# Patient Record
Sex: FEMALE | Race: WHITE | NOT HISPANIC OR LATINO | Employment: OTHER | URBAN - METROPOLITAN AREA
[De-identification: names, ages, dates, MRNs, and addresses within clinical notes are randomized per-mention and may not be internally consistent; named-entity substitution may affect disease eponyms.]

---

## 2020-06-08 ENCOUNTER — LAB VISIT (OUTPATIENT)
Dept: PRIMARY CARE CLINIC | Facility: OTHER | Age: 75
End: 2020-06-08
Payer: MEDICARE

## 2020-06-08 PROCEDURE — U0003 INFECTIOUS AGENT DETECTION BY NUCLEIC ACID (DNA OR RNA); SEVERE ACUTE RESPIRATORY SYNDROME CORONAVIRUS 2 (SARS-COV-2) (CORONAVIRUS DISEASE [COVID-19]), AMPLIFIED PROBE TECHNIQUE, MAKING USE OF HIGH THROUGHPUT TECHNOLOGIES AS DESCRIBED BY CMS-2020-01-R: HCPCS

## 2020-06-09 LAB — SARS-COV-2 RNA RESP QL NAA+PROBE: NOT DETECTED

## 2020-06-15 ENCOUNTER — LAB VISIT (OUTPATIENT)
Dept: LAB | Facility: OTHER | Age: 75
End: 2020-06-15
Payer: MEDICARE

## 2020-06-15 DIAGNOSIS — Z20.822 SUSPECTED COVID-19 VIRUS INFECTION: ICD-10-CM

## 2020-06-15 PROCEDURE — U0003 INFECTIOUS AGENT DETECTION BY NUCLEIC ACID (DNA OR RNA); SEVERE ACUTE RESPIRATORY SYNDROME CORONAVIRUS 2 (SARS-COV-2) (CORONAVIRUS DISEASE [COVID-19]), AMPLIFIED PROBE TECHNIQUE, MAKING USE OF HIGH THROUGHPUT TECHNOLOGIES AS DESCRIBED BY CMS-2020-01-R: HCPCS

## 2020-06-18 LAB — SARS-COV-2 RNA RESP QL NAA+PROBE: NOT DETECTED

## 2020-06-22 ENCOUNTER — LAB VISIT (OUTPATIENT)
Dept: LAB | Facility: OTHER | Age: 75
End: 2020-06-22
Payer: MEDICARE

## 2020-06-22 DIAGNOSIS — Z20.822 SUSPECTED COVID-19 VIRUS INFECTION: ICD-10-CM

## 2020-06-22 PROCEDURE — U0003 INFECTIOUS AGENT DETECTION BY NUCLEIC ACID (DNA OR RNA); SEVERE ACUTE RESPIRATORY SYNDROME CORONAVIRUS 2 (SARS-COV-2) (CORONAVIRUS DISEASE [COVID-19]), AMPLIFIED PROBE TECHNIQUE, MAKING USE OF HIGH THROUGHPUT TECHNOLOGIES AS DESCRIBED BY CMS-2020-01-R: HCPCS

## 2020-06-24 LAB — SARS-COV-2 RNA RESP QL NAA+PROBE: NOT DETECTED

## 2020-06-29 ENCOUNTER — LAB VISIT (OUTPATIENT)
Dept: LAB | Facility: OTHER | Age: 75
End: 2020-06-29
Payer: MEDICARE

## 2020-06-29 DIAGNOSIS — Z20.822 SUSPECTED COVID-19 VIRUS INFECTION: ICD-10-CM

## 2020-06-29 PROCEDURE — U0003 INFECTIOUS AGENT DETECTION BY NUCLEIC ACID (DNA OR RNA); SEVERE ACUTE RESPIRATORY SYNDROME CORONAVIRUS 2 (SARS-COV-2) (CORONAVIRUS DISEASE [COVID-19]), AMPLIFIED PROBE TECHNIQUE, MAKING USE OF HIGH THROUGHPUT TECHNOLOGIES AS DESCRIBED BY CMS-2020-01-R: HCPCS | Mod: ST72

## 2020-07-05 LAB — SARS-COV-2 RNA RESP QL NAA+PROBE: NEGATIVE

## 2020-07-06 ENCOUNTER — LAB VISIT (OUTPATIENT)
Dept: LAB | Facility: OTHER | Age: 75
End: 2020-07-06
Payer: MEDICARE

## 2020-07-06 DIAGNOSIS — Z20.822 SUSPECTED COVID-19 VIRUS INFECTION: ICD-10-CM

## 2020-07-06 PROCEDURE — U0003 INFECTIOUS AGENT DETECTION BY NUCLEIC ACID (DNA OR RNA); SEVERE ACUTE RESPIRATORY SYNDROME CORONAVIRUS 2 (SARS-COV-2) (CORONAVIRUS DISEASE [COVID-19]), AMPLIFIED PROBE TECHNIQUE, MAKING USE OF HIGH THROUGHPUT TECHNOLOGIES AS DESCRIBED BY CMS-2020-01-R: HCPCS

## 2020-07-07 LAB — SARS-COV-2 RNA RESP QL NAA+PROBE: NEGATIVE

## 2020-07-13 ENCOUNTER — LAB VISIT (OUTPATIENT)
Dept: LAB | Facility: OTHER | Age: 75
End: 2020-07-13
Attending: INTERNAL MEDICINE
Payer: MEDICARE

## 2020-07-13 DIAGNOSIS — Z20.822 SUSPECTED COVID-19 VIRUS INFECTION: ICD-10-CM

## 2020-07-13 PROCEDURE — U0003 INFECTIOUS AGENT DETECTION BY NUCLEIC ACID (DNA OR RNA); SEVERE ACUTE RESPIRATORY SYNDROME CORONAVIRUS 2 (SARS-COV-2) (CORONAVIRUS DISEASE [COVID-19]), AMPLIFIED PROBE TECHNIQUE, MAKING USE OF HIGH THROUGHPUT TECHNOLOGIES AS DESCRIBED BY CMS-2020-01-R: HCPCS

## 2020-07-16 LAB — SARS-COV-2 RNA RESP QL NAA+PROBE: NEGATIVE

## 2020-07-20 ENCOUNTER — LAB VISIT (OUTPATIENT)
Dept: LAB | Facility: OTHER | Age: 75
End: 2020-07-20
Payer: MEDICARE

## 2020-07-20 DIAGNOSIS — Z20.822 SUSPECTED COVID-19 VIRUS INFECTION: ICD-10-CM

## 2020-07-20 DIAGNOSIS — Z03.818 ENCOUNTER FOR OBSERVATION FOR SUSPECTED EXPOSURE TO OTHER BIOLOGICAL AGENTS RULED OUT: ICD-10-CM

## 2020-07-20 PROCEDURE — U0003 INFECTIOUS AGENT DETECTION BY NUCLEIC ACID (DNA OR RNA); SEVERE ACUTE RESPIRATORY SYNDROME CORONAVIRUS 2 (SARS-COV-2) (CORONAVIRUS DISEASE [COVID-19]), AMPLIFIED PROBE TECHNIQUE, MAKING USE OF HIGH THROUGHPUT TECHNOLOGIES AS DESCRIBED BY CMS-2020-01-R: HCPCS

## 2020-07-23 LAB — SARS-COV-2 RNA RESP QL NAA+PROBE: NEGATIVE

## 2020-07-27 ENCOUNTER — LAB VISIT (OUTPATIENT)
Dept: LAB | Facility: OTHER | Age: 75
End: 2020-07-27
Payer: MEDICARE

## 2020-07-27 DIAGNOSIS — Z20.822 SUSPECTED COVID-19 VIRUS INFECTION: ICD-10-CM

## 2020-07-27 DIAGNOSIS — Z03.818 ENCOUNTER FOR OBSERVATION FOR SUSPECTED EXPOSURE TO OTHER BIOLOGICAL AGENTS RULED OUT: ICD-10-CM

## 2020-07-27 PROCEDURE — U0003 INFECTIOUS AGENT DETECTION BY NUCLEIC ACID (DNA OR RNA); SEVERE ACUTE RESPIRATORY SYNDROME CORONAVIRUS 2 (SARS-COV-2) (CORONAVIRUS DISEASE [COVID-19]), AMPLIFIED PROBE TECHNIQUE, MAKING USE OF HIGH THROUGHPUT TECHNOLOGIES AS DESCRIBED BY CMS-2020-01-R: HCPCS

## 2020-07-31 LAB — SARS-COV-2 RNA RESP QL NAA+PROBE: NEGATIVE

## 2020-08-03 ENCOUNTER — LAB VISIT (OUTPATIENT)
Dept: LAB | Facility: OTHER | Age: 75
End: 2020-08-03
Payer: MEDICARE

## 2020-08-03 DIAGNOSIS — Z20.822 SUSPECTED COVID-19 VIRUS INFECTION: ICD-10-CM

## 2020-08-03 DIAGNOSIS — Z03.818 ENCOUNTER FOR OBSERVATION FOR SUSPECTED EXPOSURE TO OTHER BIOLOGICAL AGENTS RULED OUT: ICD-10-CM

## 2020-08-03 PROCEDURE — U0003 INFECTIOUS AGENT DETECTION BY NUCLEIC ACID (DNA OR RNA); SEVERE ACUTE RESPIRATORY SYNDROME CORONAVIRUS 2 (SARS-COV-2) (CORONAVIRUS DISEASE [COVID-19]), AMPLIFIED PROBE TECHNIQUE, MAKING USE OF HIGH THROUGHPUT TECHNOLOGIES AS DESCRIBED BY CMS-2020-01-R: HCPCS

## 2020-08-04 LAB — SARS-COV-2 RNA RESP QL NAA+PROBE: NOT DETECTED

## 2020-08-10 ENCOUNTER — LAB VISIT (OUTPATIENT)
Dept: LAB | Facility: OTHER | Age: 75
End: 2020-08-10
Payer: MEDICARE

## 2020-08-10 DIAGNOSIS — Z03.818 ENCOUNTER FOR OBSERVATION FOR SUSPECTED EXPOSURE TO OTHER BIOLOGICAL AGENTS RULED OUT: ICD-10-CM

## 2020-08-10 PROCEDURE — U0003 INFECTIOUS AGENT DETECTION BY NUCLEIC ACID (DNA OR RNA); SEVERE ACUTE RESPIRATORY SYNDROME CORONAVIRUS 2 (SARS-COV-2) (CORONAVIRUS DISEASE [COVID-19]), AMPLIFIED PROBE TECHNIQUE, MAKING USE OF HIGH THROUGHPUT TECHNOLOGIES AS DESCRIBED BY CMS-2020-01-R: HCPCS

## 2020-08-11 LAB — SARS-COV-2 RNA RESP QL NAA+PROBE: NOT DETECTED

## 2020-08-17 ENCOUNTER — LAB VISIT (OUTPATIENT)
Dept: LAB | Facility: OTHER | Age: 75
End: 2020-08-17
Payer: MEDICARE

## 2020-08-17 DIAGNOSIS — Z03.818 ENCOUNTER FOR OBSERVATION FOR SUSPECTED EXPOSURE TO OTHER BIOLOGICAL AGENTS RULED OUT: ICD-10-CM

## 2020-08-17 PROCEDURE — U0003 INFECTIOUS AGENT DETECTION BY NUCLEIC ACID (DNA OR RNA); SEVERE ACUTE RESPIRATORY SYNDROME CORONAVIRUS 2 (SARS-COV-2) (CORONAVIRUS DISEASE [COVID-19]), AMPLIFIED PROBE TECHNIQUE, MAKING USE OF HIGH THROUGHPUT TECHNOLOGIES AS DESCRIBED BY CMS-2020-01-R: HCPCS

## 2020-08-20 LAB — SARS-COV-2 RNA RESP QL NAA+PROBE: NOT DETECTED

## 2020-08-24 ENCOUNTER — LAB VISIT (OUTPATIENT)
Dept: LAB | Facility: OTHER | Age: 75
End: 2020-08-24
Payer: MEDICARE

## 2020-08-24 DIAGNOSIS — Z03.818 ENCOUNTER FOR OBSERVATION FOR SUSPECTED EXPOSURE TO OTHER BIOLOGICAL AGENTS RULED OUT: ICD-10-CM

## 2020-08-24 PROCEDURE — U0003 INFECTIOUS AGENT DETECTION BY NUCLEIC ACID (DNA OR RNA); SEVERE ACUTE RESPIRATORY SYNDROME CORONAVIRUS 2 (SARS-COV-2) (CORONAVIRUS DISEASE [COVID-19]), AMPLIFIED PROBE TECHNIQUE, MAKING USE OF HIGH THROUGHPUT TECHNOLOGIES AS DESCRIBED BY CMS-2020-01-R: HCPCS

## 2020-08-25 LAB — SARS-COV-2 RNA RESP QL NAA+PROBE: NOT DETECTED

## 2020-08-31 ENCOUNTER — LAB VISIT (OUTPATIENT)
Dept: LAB | Facility: OTHER | Age: 75
End: 2020-08-31
Payer: MEDICARE

## 2020-08-31 DIAGNOSIS — Z03.818 ENCOUNTER FOR OBSERVATION FOR SUSPECTED EXPOSURE TO OTHER BIOLOGICAL AGENTS RULED OUT: ICD-10-CM

## 2020-08-31 PROCEDURE — U0003 INFECTIOUS AGENT DETECTION BY NUCLEIC ACID (DNA OR RNA); SEVERE ACUTE RESPIRATORY SYNDROME CORONAVIRUS 2 (SARS-COV-2) (CORONAVIRUS DISEASE [COVID-19]), AMPLIFIED PROBE TECHNIQUE, MAKING USE OF HIGH THROUGHPUT TECHNOLOGIES AS DESCRIBED BY CMS-2020-01-R: HCPCS

## 2020-09-02 LAB — SARS-COV-2 RNA RESP QL NAA+PROBE: NOT DETECTED

## 2022-03-31 ENCOUNTER — HOSPITAL ENCOUNTER (INPATIENT)
Facility: HOSPITAL | Age: 77
LOS: 4 days | Discharge: HOSPICE | DRG: 064 | End: 2022-04-05
Attending: EMERGENCY MEDICINE | Admitting: STUDENT IN AN ORGANIZED HEALTH CARE EDUCATION/TRAINING PROGRAM
Payer: MEDICARE

## 2022-03-31 DIAGNOSIS — G93.41 METABOLIC ENCEPHALOPATHY: ICD-10-CM

## 2022-03-31 DIAGNOSIS — J30.9 ALLERGIC RHINITIS, UNSPECIFIED SEASONALITY, UNSPECIFIED TRIGGER: ICD-10-CM

## 2022-03-31 DIAGNOSIS — E78.2 MIXED HYPERLIPIDEMIA: ICD-10-CM

## 2022-03-31 DIAGNOSIS — R07.9 CHEST PAIN: ICD-10-CM

## 2022-03-31 DIAGNOSIS — R41.82 ALTERED MENTAL STATUS: ICD-10-CM

## 2022-03-31 DIAGNOSIS — N30.01 ACUTE CYSTITIS WITH HEMATURIA: ICD-10-CM

## 2022-03-31 DIAGNOSIS — N39.0 URINARY TRACT INFECTION WITHOUT HEMATURIA, SITE UNSPECIFIED: Primary | ICD-10-CM

## 2022-03-31 DIAGNOSIS — E11.9 TYPE 2 DIABETES MELLITUS WITHOUT COMPLICATION, WITHOUT LONG-TERM CURRENT USE OF INSULIN: ICD-10-CM

## 2022-03-31 DIAGNOSIS — I63.9 CEREBROVASCULAR ACCIDENT (CVA), UNSPECIFIED MECHANISM: ICD-10-CM

## 2022-03-31 DIAGNOSIS — G93.40 ACUTE ENCEPHALOPATHY: ICD-10-CM

## 2022-03-31 DIAGNOSIS — R53.1 WEAKNESS: ICD-10-CM

## 2022-03-31 DIAGNOSIS — F03.90 DEMENTIA WITHOUT BEHAVIORAL DISTURBANCE, UNSPECIFIED DEMENTIA TYPE: ICD-10-CM

## 2022-03-31 DIAGNOSIS — G93.41 ENCEPHALOPATHY, METABOLIC: ICD-10-CM

## 2022-03-31 DIAGNOSIS — I10 PRIMARY HYPERTENSION: ICD-10-CM

## 2022-03-31 DIAGNOSIS — F33.0 MILD EPISODE OF RECURRENT MAJOR DEPRESSIVE DISORDER: ICD-10-CM

## 2022-03-31 LAB
ALBUMIN SERPL BCP-MCNC: 3.8 G/DL (ref 3.5–5.2)
ALP SERPL-CCNC: 138 U/L (ref 55–135)
ALT SERPL W/O P-5'-P-CCNC: 18 U/L (ref 10–44)
ANION GAP SERPL CALC-SCNC: 12 MMOL/L (ref 8–16)
AST SERPL-CCNC: 30 U/L (ref 10–40)
BACTERIA #/AREA URNS HPF: ABNORMAL /HPF
BASOPHILS # BLD AUTO: 0.06 K/UL (ref 0–0.2)
BASOPHILS NFR BLD: 0.7 % (ref 0–1.9)
BILIRUB SERPL-MCNC: 0.6 MG/DL (ref 0.1–1)
BILIRUB UR QL STRIP: NEGATIVE
BNP SERPL-MCNC: 42 PG/ML (ref 0–99)
BUN SERPL-MCNC: 15 MG/DL (ref 8–23)
CALCIUM SERPL-MCNC: 9.7 MG/DL (ref 8.7–10.5)
CHLORIDE SERPL-SCNC: 106 MMOL/L (ref 95–110)
CK SERPL-CCNC: 166 U/L (ref 20–180)
CLARITY UR: CLEAR
CO2 SERPL-SCNC: 24 MMOL/L (ref 23–29)
COLOR UR: YELLOW
CREAT SERPL-MCNC: 1.1 MG/DL (ref 0.5–1.4)
DIFFERENTIAL METHOD: ABNORMAL
EOSINOPHIL # BLD AUTO: 0.1 K/UL (ref 0–0.5)
EOSINOPHIL NFR BLD: 1.2 % (ref 0–8)
ERYTHROCYTE [DISTWIDTH] IN BLOOD BY AUTOMATED COUNT: 14.1 % (ref 11.5–14.5)
EST. GFR  (AFRICAN AMERICAN): 57 ML/MIN/1.73 M^2
EST. GFR  (NON AFRICAN AMERICAN): 50 ML/MIN/1.73 M^2
GLUCOSE SERPL-MCNC: 129 MG/DL (ref 70–110)
GLUCOSE UR QL STRIP: NEGATIVE
HCT VFR BLD AUTO: 46.3 % (ref 37–48.5)
HGB BLD-MCNC: 15.1 G/DL (ref 12–16)
HGB UR QL STRIP: NEGATIVE
IMM GRANULOCYTES # BLD AUTO: 0.01 K/UL (ref 0–0.04)
IMM GRANULOCYTES NFR BLD AUTO: 0.1 % (ref 0–0.5)
KETONES UR QL STRIP: NEGATIVE
LEUKOCYTE ESTERASE UR QL STRIP: ABNORMAL
LYMPHOCYTES # BLD AUTO: 2.3 K/UL (ref 1–4.8)
LYMPHOCYTES NFR BLD: 26 % (ref 18–48)
MAGNESIUM SERPL-MCNC: 1.9 MG/DL (ref 1.6–2.6)
MCH RBC QN AUTO: 26.6 PG (ref 27–31)
MCHC RBC AUTO-ENTMCNC: 32.6 G/DL (ref 32–36)
MCV RBC AUTO: 82 FL (ref 82–98)
MICROSCOPIC COMMENT: ABNORMAL
MONOCYTES # BLD AUTO: 0.6 K/UL (ref 0.3–1)
MONOCYTES NFR BLD: 7.3 % (ref 4–15)
NEUTROPHILS # BLD AUTO: 5.7 K/UL (ref 1.8–7.7)
NEUTROPHILS NFR BLD: 64.7 % (ref 38–73)
NITRITE UR QL STRIP: POSITIVE
NRBC BLD-RTO: 0 /100 WBC
PH UR STRIP: 6 [PH] (ref 5–8)
PLATELET # BLD AUTO: 292 K/UL (ref 150–450)
PMV BLD AUTO: 10.5 FL (ref 9.2–12.9)
POCT GLUCOSE: 153 MG/DL (ref 70–110)
POCT GLUCOSE: 222 MG/DL (ref 70–110)
POTASSIUM SERPL-SCNC: 5.4 MMOL/L (ref 3.5–5.1)
PROT SERPL-MCNC: 8 G/DL (ref 6–8.4)
PROT UR QL STRIP: NEGATIVE
RBC # BLD AUTO: 5.67 M/UL (ref 4–5.4)
RBC #/AREA URNS HPF: 1 /HPF (ref 0–4)
SODIUM SERPL-SCNC: 142 MMOL/L (ref 136–145)
SP GR UR STRIP: 1.01 (ref 1–1.03)
TROPONIN I SERPL DL<=0.01 NG/ML-MCNC: 0.02 NG/ML (ref 0–0.03)
TSH SERPL DL<=0.005 MIU/L-ACNC: 0.6 UIU/ML (ref 0.4–4)
UNIDENT CRYS URNS QL MICRO: 2
URN SPEC COLLECT METH UR: ABNORMAL
UROBILINOGEN UR STRIP-ACNC: NEGATIVE EU/DL
WBC # BLD AUTO: 8.82 K/UL (ref 3.9–12.7)
WBC #/AREA URNS HPF: 5 /HPF (ref 0–5)

## 2022-03-31 PROCEDURE — 93005 ELECTROCARDIOGRAM TRACING: CPT

## 2022-03-31 PROCEDURE — 96372 THER/PROPH/DIAG INJ SC/IM: CPT | Performed by: STUDENT IN AN ORGANIZED HEALTH CARE EDUCATION/TRAINING PROGRAM

## 2022-03-31 PROCEDURE — 87086 URINE CULTURE/COLONY COUNT: CPT | Performed by: EMERGENCY MEDICINE

## 2022-03-31 PROCEDURE — 83735 ASSAY OF MAGNESIUM: CPT | Performed by: EMERGENCY MEDICINE

## 2022-03-31 PROCEDURE — 25000003 PHARM REV CODE 250: Performed by: STUDENT IN AN ORGANIZED HEALTH CARE EDUCATION/TRAINING PROGRAM

## 2022-03-31 PROCEDURE — 83880 ASSAY OF NATRIURETIC PEPTIDE: CPT | Performed by: EMERGENCY MEDICINE

## 2022-03-31 PROCEDURE — G0378 HOSPITAL OBSERVATION PER HR: HCPCS

## 2022-03-31 PROCEDURE — 85025 COMPLETE CBC W/AUTO DIFF WBC: CPT | Performed by: EMERGENCY MEDICINE

## 2022-03-31 PROCEDURE — 82962 GLUCOSE BLOOD TEST: CPT

## 2022-03-31 PROCEDURE — 25000003 PHARM REV CODE 250: Performed by: EMERGENCY MEDICINE

## 2022-03-31 PROCEDURE — 80053 COMPREHEN METABOLIC PANEL: CPT | Performed by: EMERGENCY MEDICINE

## 2022-03-31 PROCEDURE — 93010 ELECTROCARDIOGRAM REPORT: CPT | Mod: ,,, | Performed by: INTERNAL MEDICINE

## 2022-03-31 PROCEDURE — 63600175 PHARM REV CODE 636 W HCPCS: Performed by: STUDENT IN AN ORGANIZED HEALTH CARE EDUCATION/TRAINING PROGRAM

## 2022-03-31 PROCEDURE — 96361 HYDRATE IV INFUSION ADD-ON: CPT

## 2022-03-31 PROCEDURE — 63600175 PHARM REV CODE 636 W HCPCS: Performed by: EMERGENCY MEDICINE

## 2022-03-31 PROCEDURE — 96365 THER/PROPH/DIAG IV INF INIT: CPT

## 2022-03-31 PROCEDURE — 84484 ASSAY OF TROPONIN QUANT: CPT | Performed by: EMERGENCY MEDICINE

## 2022-03-31 PROCEDURE — 93010 EKG 12-LEAD: ICD-10-PCS | Mod: ,,, | Performed by: INTERNAL MEDICINE

## 2022-03-31 PROCEDURE — 81000 URINALYSIS NONAUTO W/SCOPE: CPT | Performed by: EMERGENCY MEDICINE

## 2022-03-31 PROCEDURE — 84443 ASSAY THYROID STIM HORMONE: CPT | Performed by: EMERGENCY MEDICINE

## 2022-03-31 PROCEDURE — 99285 EMERGENCY DEPT VISIT HI MDM: CPT | Mod: 25

## 2022-03-31 PROCEDURE — 96375 TX/PRO/DX INJ NEW DRUG ADDON: CPT

## 2022-03-31 PROCEDURE — 87040 BLOOD CULTURE FOR BACTERIA: CPT | Mod: 59 | Performed by: EMERGENCY MEDICINE

## 2022-03-31 PROCEDURE — 82550 ASSAY OF CK (CPK): CPT | Performed by: EMERGENCY MEDICINE

## 2022-03-31 RX ORDER — ASPIRIN 81 MG/1
81 TABLET ORAL DAILY
COMMUNITY

## 2022-03-31 RX ORDER — LIDOCAINE 50 MG/G
1 PATCH TOPICAL
Status: DISCONTINUED | OUTPATIENT
Start: 2022-03-31 | End: 2022-04-05 | Stop reason: HOSPADM

## 2022-03-31 RX ORDER — FENOFIBRATE 145 MG/1
145 TABLET, FILM COATED ORAL DAILY
Status: ON HOLD | COMMUNITY
Start: 2022-03-02 | End: 2022-04-03 | Stop reason: HOSPADM

## 2022-03-31 RX ORDER — LISINOPRIL 20 MG/1
40 TABLET ORAL DAILY
Status: DISCONTINUED | OUTPATIENT
Start: 2022-04-01 | End: 2022-04-05 | Stop reason: HOSPADM

## 2022-03-31 RX ORDER — CLONIDINE 0.1 MG/24H
1 PATCH, EXTENDED RELEASE TRANSDERMAL WEEKLY
COMMUNITY
Start: 2022-03-26

## 2022-03-31 RX ORDER — CLOPIDOGREL BISULFATE 75 MG/1
300 TABLET ORAL ONCE
Status: DISCONTINUED | OUTPATIENT
Start: 2022-03-31 | End: 2022-04-02

## 2022-03-31 RX ORDER — ENOXAPARIN SODIUM 100 MG/ML
40 INJECTION SUBCUTANEOUS EVERY 24 HOURS
Status: DISCONTINUED | OUTPATIENT
Start: 2022-03-31 | End: 2022-04-05 | Stop reason: HOSPADM

## 2022-03-31 RX ORDER — AMLODIPINE BESYLATE 10 MG/1
10 TABLET ORAL EVERY MORNING
COMMUNITY
Start: 2022-03-23

## 2022-03-31 RX ORDER — ATORVASTATIN CALCIUM 40 MG/1
80 TABLET, FILM COATED ORAL NIGHTLY
Status: DISCONTINUED | OUTPATIENT
Start: 2022-03-31 | End: 2022-04-05 | Stop reason: HOSPADM

## 2022-03-31 RX ORDER — INSULIN ASPART 100 [IU]/ML
1-10 INJECTION, SOLUTION INTRAVENOUS; SUBCUTANEOUS
Status: DISCONTINUED | OUTPATIENT
Start: 2022-03-31 | End: 2022-04-02

## 2022-03-31 RX ORDER — ACETAMINOPHEN 325 MG/1
650 TABLET ORAL EVERY 6 HOURS PRN
Status: DISCONTINUED | OUTPATIENT
Start: 2022-03-31 | End: 2022-04-05 | Stop reason: HOSPADM

## 2022-03-31 RX ORDER — IBUPROFEN 200 MG
16 TABLET ORAL
Status: DISCONTINUED | OUTPATIENT
Start: 2022-03-31 | End: 2022-04-05 | Stop reason: HOSPADM

## 2022-03-31 RX ORDER — SODIUM CHLORIDE 0.9 % (FLUSH) 0.9 %
5 SYRINGE (ML) INJECTION
Status: DISCONTINUED | OUTPATIENT
Start: 2022-03-31 | End: 2022-04-05 | Stop reason: HOSPADM

## 2022-03-31 RX ORDER — IBUPROFEN 200 MG
24 TABLET ORAL
Status: DISCONTINUED | OUTPATIENT
Start: 2022-03-31 | End: 2022-04-05 | Stop reason: HOSPADM

## 2022-03-31 RX ORDER — TALC
6 POWDER (GRAM) TOPICAL NIGHTLY PRN
Status: DISCONTINUED | OUTPATIENT
Start: 2022-03-31 | End: 2022-04-05 | Stop reason: HOSPADM

## 2022-03-31 RX ORDER — AMOXICILLIN 250 MG
1 CAPSULE ORAL 2 TIMES DAILY PRN
Status: DISCONTINUED | OUTPATIENT
Start: 2022-03-31 | End: 2022-04-05 | Stop reason: HOSPADM

## 2022-03-31 RX ORDER — NAPROXEN SODIUM 220 MG/1
81 TABLET, FILM COATED ORAL DAILY
Status: DISCONTINUED | OUTPATIENT
Start: 2022-04-01 | End: 2022-04-05 | Stop reason: HOSPADM

## 2022-03-31 RX ORDER — GLIPIZIDE 5 MG/1
5 TABLET, FILM COATED, EXTENDED RELEASE ORAL DAILY
COMMUNITY
Start: 2022-03-28

## 2022-03-31 RX ORDER — HYDRALAZINE HYDROCHLORIDE 20 MG/ML
10 INJECTION INTRAMUSCULAR; INTRAVENOUS ONCE
Status: DISCONTINUED | OUTPATIENT
Start: 2022-03-31 | End: 2022-04-02

## 2022-03-31 RX ORDER — TRAZODONE HYDROCHLORIDE 50 MG/1
50 TABLET ORAL NIGHTLY
Status: ON HOLD | COMMUNITY
Start: 2022-03-02 | End: 2022-04-03 | Stop reason: HOSPADM

## 2022-03-31 RX ORDER — MIRTAZAPINE 15 MG/1
15 TABLET, FILM COATED ORAL NIGHTLY
COMMUNITY
Start: 2022-03-22

## 2022-03-31 RX ORDER — FENOFIBRATE 145 MG/1
145 TABLET, FILM COATED ORAL DAILY
Status: DISCONTINUED | OUTPATIENT
Start: 2022-04-01 | End: 2022-04-02

## 2022-03-31 RX ORDER — LORAZEPAM 0.5 MG/1
0.5 TABLET ORAL 2 TIMES DAILY
Status: ON HOLD | COMMUNITY
End: 2022-04-04 | Stop reason: SDUPTHER

## 2022-03-31 RX ORDER — CLOPIDOGREL BISULFATE 75 MG/1
75 TABLET ORAL DAILY
Status: DISCONTINUED | OUTPATIENT
Start: 2022-04-01 | End: 2022-04-05 | Stop reason: HOSPADM

## 2022-03-31 RX ORDER — ATORVASTATIN CALCIUM 80 MG/1
80 TABLET, FILM COATED ORAL NIGHTLY
COMMUNITY
Start: 2022-03-22

## 2022-03-31 RX ORDER — CLOPIDOGREL BISULFATE 75 MG/1
300 TABLET ORAL DAILY
Status: DISCONTINUED | OUTPATIENT
Start: 2022-03-31 | End: 2022-03-31

## 2022-03-31 RX ORDER — LORATADINE 10 MG/1
10 TABLET ORAL DAILY
Status: ON HOLD | COMMUNITY
End: 2022-04-03 | Stop reason: HOSPADM

## 2022-03-31 RX ORDER — LISINOPRIL 40 MG/1
40 TABLET ORAL DAILY
COMMUNITY
Start: 2022-03-16

## 2022-03-31 RX ORDER — AMLODIPINE BESYLATE 5 MG/1
10 TABLET ORAL DAILY
Status: DISCONTINUED | OUTPATIENT
Start: 2022-03-31 | End: 2022-04-05 | Stop reason: HOSPADM

## 2022-03-31 RX ORDER — METFORMIN HYDROCHLORIDE 1000 MG/1
1000 TABLET ORAL NIGHTLY
COMMUNITY
Start: 2022-03-17

## 2022-03-31 RX ORDER — MIRTAZAPINE 7.5 MG/1
15 TABLET, FILM COATED ORAL NIGHTLY
Status: DISCONTINUED | OUTPATIENT
Start: 2022-03-31 | End: 2022-04-05 | Stop reason: HOSPADM

## 2022-03-31 RX ORDER — GLUCAGON 1 MG
1 KIT INJECTION
Status: DISCONTINUED | OUTPATIENT
Start: 2022-03-31 | End: 2022-04-05 | Stop reason: HOSPADM

## 2022-03-31 RX ADMIN — CEFTRIAXONE 1 G: 1 INJECTION, SOLUTION INTRAVENOUS at 03:03

## 2022-03-31 RX ADMIN — MIRTAZAPINE 15 MG: 7.5 TABLET ORAL at 09:03

## 2022-03-31 RX ADMIN — SODIUM CHLORIDE, SODIUM LACTATE, POTASSIUM CHLORIDE, AND CALCIUM CHLORIDE 500 ML: .6; .31; .03; .02 INJECTION, SOLUTION INTRAVENOUS at 05:03

## 2022-03-31 RX ADMIN — INSULIN ASPART 2 UNITS: 100 INJECTION, SOLUTION INTRAVENOUS; SUBCUTANEOUS at 10:03

## 2022-03-31 RX ADMIN — LIDOCAINE 1 PATCH: 50 PATCH CUTANEOUS at 05:03

## 2022-03-31 RX ADMIN — ACETAMINOPHEN 650 MG: 325 TABLET ORAL at 09:03

## 2022-03-31 RX ADMIN — ENOXAPARIN SODIUM 40 MG: 100 INJECTION SUBCUTANEOUS at 05:03

## 2022-03-31 RX ADMIN — SODIUM CHLORIDE 1000 ML: 0.9 INJECTION, SOLUTION INTRAVENOUS at 02:03

## 2022-03-31 RX ADMIN — ATORVASTATIN CALCIUM 80 MG: 40 TABLET, FILM COATED ORAL at 09:03

## 2022-03-31 RX ADMIN — AMLODIPINE BESYLATE 10 MG: 5 TABLET ORAL at 04:03

## 2022-03-31 NOTE — ASSESSMENT & PLAN NOTE
- Continue home remeron 15mg nightly  - Hold home trazodone 12.5mg as dose unavailable in the hospital and could be contributing to AMS  - Hold home ativan 0.5mg BID PRN, will monitor for signs of benzo withdrawal with q4hr CIWA

## 2022-03-31 NOTE — ASSESSMENT & PLAN NOTE
- Unknown baseline, A&O to self and place only on admission  - Delirium precautions, avoid beers criteria medications  - Continue baseline mechanical soft diet   - Pt is DNR per [facility] sent from nursing home. Comfort measures only except for antibiotics if antibiotics can prolong life.

## 2022-03-31 NOTE — ASSESSMENT & PLAN NOTE
- Unknown baseline, A&O to self and place only on admission  - Delirium precautions, avoid beers criteria medications  - Continue baseline mechanical soft diet

## 2022-03-31 NOTE — ASSESSMENT & PLAN NOTE
- Continue home amlodipine 10mg, lisinopril 40mg and ASA 81mg  - Hold home clonidine patch as dose not written on nursing home orders

## 2022-03-31 NOTE — HPI
Renee Sultana is a 76 y.o. female with PMH MDD, dementia without behavioral disturbance, HTN, HLD, T2DM who was sent from nursing home with concern for altered mental status. Per report given to ED provider pt noted to be more lethargic than usual at nursing home. On exam pt only complaint is midline low back pain.     In the ED pt hypertensive, all other VSS. UA with positive leukocytes and nitrites. CBC and CMP unremarkable aside from potassium 5.4. CT head without acute process. TSH, troponin, BNP and CK wnl. Pt given dose of IV ceftriaxone and [facility] consulted for admission.

## 2022-03-31 NOTE — ED TRIAGE NOTES
Renee Sultana, an 76 y.o. female presents to the ED via EMS from [facility]. Pt was sent over for delayed speech and not talking for the past two days. Pt verbal and complains of abd pain and lower back pain.       Review of patient's allergies indicates:  No Known Allergies  Chief Complaint   Patient presents with    Altered Mental Status     Sent from [facility] for evaluation of decreased mental status x1 day. Describes pt. Is slower to responds than normal and not as alert and talkative.      History reviewed. No pertinent past medical history.    Patient identifiers verified and correct.     LOC: The patient is awake, alert and aware of environment with an appropriate affect, the patient is oriented x 3 and speaking appropriately.     APPEARANCE: Patient appears comfortable and in no acute distress, patient is clean and well groomed.    HEENT: Head symmetrical. Eyes bilateral.  Bilateral ears without drainage. Bilateral nares patent, throat clear. Pt has a gazed to the right.     SKIN: The skin is warm and dry, color consistent with ethnicity, patient has normal skin turgor and dry mucus membranes, skin intact, no breakdown or bruising noted.     MUSCULOSKELETAL: Patient moving all extremities spontaneously, no swelling noted.    RESPIRATORY: Airway is open and patent, respirations are spontaneous, patient has a normal effort and rate, no accessory muscle use noted.     CARDIAC: Patient has a normal rate and regular rhythm, no edema noted, capillary refill < 3 seconds.     GASTRO: Abdomen soft and non-distended. Tenderness in lower abd    NEURO: Pt opens eyes spontaneously pupils equal, round, and reactive. behavior appropriate to situation, follows commands, left sided facial drop possibly from previous stroke. Strong right hand , no movement or strength to left side. No movement to legs bilaterally.     NEUROVASCULAR: All extremities are warm and pink.       Will continue to monitor.

## 2022-03-31 NOTE — H&P
[facility]  History & Physical    Patient Name: Renee Sultana  MRN: [id number]  Patient Class: OP- Observation  Admission Date: 3/31/2022  Attending Physician: Sujey Novak MD   Primary Care Provider: Jarred Miramontes MD         Patient information was obtained from patient, EMS personnel and ER records.     Subjective:     Principal Problem:Encephalopathy, metabolic    Chief Complaint:   Chief Complaint   Patient presents with    Altered Mental Status     Sent from [facility] for evaluation of decreased mental status x1 day. Describes pt. Is slower to responds than normal and not as alert and talkative.         HPI: Renee Sultana is a 76 y.o. female with PMH MDD, dementia without behavioral disturbance, HTN, HLD, T2DM who was sent from nursing home with concern for altered mental status. Per report given to ED provider pt noted to be more lethargic than usual at nursing home. On exam pt only complaint is midline low back pain.     In the ED pt hypertensive, all other VSS. UA with positive leukocytes and nitrites. CBC and CMP unremarkable aside from potassium 5.4. CT head without acute process. TSH, troponin, BNP and CK wnl. Pt given dose of IV ceftriaxone and [facility] consulted for admission.       History reviewed. No pertinent past medical history.    History reviewed. No pertinent surgical history.    Review of patient's allergies indicates:  No Known Allergies    No current facility-administered medications on file prior to encounter.     No current outpatient medications on file prior to encounter.     Family History    None       Tobacco Use    Smoking status: Not on file    Smokeless tobacco: Not on file   Substance and Sexual Activity    Alcohol use: Not on file    Drug use: Not on file    Sexual activity: Not on file     Review of Systems   Unable to perform ROS: Dementia   Constitutional:  Negative for fever.   HENT:  Negative for  congestion.    Respiratory:  Negative for shortness of breath.    Gastrointestinal:  Negative for abdominal pain.   Musculoskeletal:  Positive for back pain.   Neurological:  Negative for headaches.   Psychiatric/Behavioral:  The patient is not nervous/anxious.    Objective:     Vital Signs (Most Recent):  Temp: 98.5 °F (36.9 °C) (03/31/22 1321)  Pulse: 92 (03/31/22 1321)  Resp: 18 (03/31/22 1321)  BP: (!) 179/89 (03/31/22 1321)  SpO2: 97 % (03/31/22 1321)   Vital Signs (24h Range):  Temp:  [98.5 °F (36.9 °C)] 98.5 °F (36.9 °C)  Pulse:  [92] 92  Resp:  [18] 18  SpO2:  [97 %] 97 %  BP: (179)/(89) 179/89     Weight: 60.3 kg (133 lb)  Body mass index is 20.22 kg/m².    Physical Exam  Vitals and nursing note reviewed.   Constitutional:       General: She is not in acute distress.     Appearance: Normal appearance.   HENT:      Head: Normocephalic and atraumatic.      Right Ear: External ear normal.      Left Ear: External ear normal.      Nose: Nose normal. No congestion.      Mouth/Throat:      Mouth: Mucous membranes are dry.      Pharynx: Oropharynx is clear.   Eyes:      Extraocular Movements: Extraocular movements intact.      Conjunctiva/sclera: Conjunctivae normal.   Cardiovascular:      Rate and Rhythm: Normal rate and regular rhythm.      Pulses: Normal pulses.      Heart sounds: No murmur heard.  Pulmonary:      Effort: Pulmonary effort is normal. No respiratory distress.      Breath sounds: No wheezing.   Abdominal:      General: Abdomen is flat. Bowel sounds are normal.      Palpations: Abdomen is soft.      Tenderness: There is abdominal tenderness (suprapubic). There is no guarding.   Musculoskeletal:         General: Normal range of motion.      Cervical back: Normal range of motion. No muscular tenderness.      Right lower leg: No edema.      Left lower leg: No edema.   Lymphadenopathy:      Cervical: No cervical adenopathy.   Skin:     General: Skin is warm and dry.      Capillary Refill: Capillary  refill takes 2 to 3 seconds.      Findings: No erythema or rash.   Neurological:      General: No focal deficit present.      Mental Status: She is alert.      Sensory: No sensory deficit.      Coordination: Coordination normal.      Comments: Oriented to self and place not date or situation.    Psychiatric:         Mood and Affect: Mood normal.         Behavior: Behavior normal.           Significant Labs: All pertinent labs within the past 24 hours have been reviewed.  CBC:   Recent Labs   Lab 03/31/22  1417   WBC 8.82   HGB 15.1   HCT 46.3        CMP:   Recent Labs   Lab 03/31/22  1417      K 5.4*      CO2 24   *   BUN 15   CREATININE 1.1   CALCIUM 9.7   PROT 8.0   ALBUMIN 3.8   BILITOT 0.6   ALKPHOS 138*   AST 30   ALT 18   ANIONGAP 12   EGFRNONAA 50*     Troponin:   Recent Labs   Lab 03/31/22  1417   TROPONINI 0.023     TSH:   Recent Labs   Lab 03/31/22  1417   TSH 0.603     Urine Studies:   Recent Labs   Lab 03/31/22  1415   COLORU Yellow   APPEARANCEUA Clear   PHUR 6.0   SPECGRAV 1.010   PROTEINUA Negative   GLUCUA Negative   KETONESU Negative   BILIRUBINUA Negative   OCCULTUA Negative   NITRITE Positive*   UROBILINOGEN Negative   LEUKOCYTESUR 1+*   RBCUA 1   WBCUA 5   BACTERIA Many*       Significant Imaging: I have reviewed all pertinent imaging results/findings within the past 24 hours.    Imaging Results              CT Head Without Contrast (Final result)  Result time 03/31/22 15:47:36      Final result by Pa Espitia MD (03/31/22 15:47:36)                   Impression:      No acute intracranial process.  Additional evaluation, as clinically warranted.    Changes of extensive chronic small vessel ischemic disease and cerebral volume loss.      Electronically signed by: Pa Espitia MD  Date:    03/31/2022  Time:    15:47               Narrative:    EXAMINATION:  CT HEAD WITHOUT CONTRAST    CLINICAL HISTORY:  Mental status change, unknown cause;    TECHNIQUE:  Low dose axial  images were obtained through the head.  Coronal and sagittal reformations were also performed. Contrast was not administered.    COMPARISON:  None.    FINDINGS:  The subcutaneous tissues are unremarkable.  The bony calvarium is intact.  The paranasal sinuses are within normal limits.  The mastoid air cells are clear.  The orbits and intraorbital contents are within normal limits.    The craniocervical junction is intact.  There are no extra-axial fluid collections.  There is no evidence of intracranial hemorrhage.  The ventricles and sulci are prominent suggestive of cerebral volume loss.  There are extensive hypodensities within the periventricular and subcortical white matter.  The gray-white differentiation is maintained.  There are extensive calcifications of the skull base vessels.  There is no dense vessel sign.  There is no evidence of mass effect.                                       X-Ray Chest 1 View (Final result)  Result time 03/31/22 15:06:20      Final result by Ankit Vazquez MD (03/31/22 15:06:20)                   Impression:      1. Mediastinal configuration is likely related to patient positioning however consider PA and lateral for further evaluation.  No large focal consolidation.      Electronically signed by: Ankit Vazquez MD  Date:    03/31/2022  Time:    15:06               Narrative:    EXAMINATION:  XR CHEST 1 VIEW    CLINICAL HISTORY:  Altered mental status;    TECHNIQUE:  Single frontal view of the chest was performed.    COMPARISON:  None    FINDINGS:  The cardiomediastinal silhouette is prominent noting magnification by technique.  Patient is rotated.  There is prominence of the right paratracheal region, may be on the basis of rotation.  There is atherosclerotic calcification of the aorta..  There is no pleural effusion.  The trachea is midline.  The lungs are symmetrically expanded bilaterally with minimally coarse interstitial attenuation.  No large focal consolidation seen.   There is no pneumothorax.  The osseous structures are remarkable for degenerative changes..                                        Assessment/Plan:     * Encephalopathy, metabolic  Sent from nursing home 2/2 being more lethargic than baseline  No hallucinations or confusion noted  CT head negative on admission  UA with leukocytes and nitrites, encephalopathy thought to be 2/2 UTI  TSH wnl    PLAN:  - Full AMS work-up with HIV, RPR, B12, folate, ammonia and hep c  - Delirium precautions  - Unclear baseline functional status, appreciate PT/OT evaluation   - Plan per UTI      Mixed hyperlipidemia  Continue home lipitor and fenofibrate       Type 2 diabetes mellitus, without long-term current use of insulin  Home regimen: Metformin 1g nightly and glipizide 5mg daily    PLAN:  - Hold home medications  - MDSSI with goal glucose 140-180      Dementia without behavioral disturbance  - Unknown baseline, A&O to self and place only on admission  - Delirium precautions, avoid beers criteria medications  - Continue baseline mechanical soft diet        - Pt is DNR per [facility] sent from nursing home. Comfort measures only except for antibiotics if antibiotics can prolong life.       Mild episode of recurrent major depressive disorder  - Continue home remeron 15mg nightly  - Hold home trazodone 12.5mg as dose unavailable in the hospital and could be contributing to AMS  - Hold home ativan 0.5mg BID PRN, will monitor for signs of benzo withdrawal with q4hr CIWA      Primary hypertension  - Continue home amlodipine 10mg, lisinopril 40mg and ASA 81mg  - Hold home clonidine patch as dose not written on nursing home orders      Acute cystitis with hematuria  UA with leukocytes and nitrites on admission  Noted to have back pain on admission  S/p 1g rocephin in the ED    PLAN:  - Appears fluid down, will give 500cc LR bolus  - Start zosyn, unable to rule-out pyelonephritis at this time  - UCx pending  - Lidocaine patch PRN for back  pain        VTE Risk Mitigation (From admission, onward)         Ordered     enoxaparin injection 40 mg  Daily         03/31/22 1549     IP VTE HIGH RISK PATIENT  Once         03/31/22 1549     Place sequential compression device  Until discontinued         03/31/22 1549                   Joseph Vragas DO  [facility], PGY-2

## 2022-03-31 NOTE — SUBJECTIVE & OBJECTIVE
History reviewed. No pertinent past medical history.    History reviewed. No pertinent surgical history.    Review of patient's allergies indicates:  No Known Allergies    No current facility-administered medications on file prior to encounter.     No current outpatient medications on file prior to encounter.     Family History    None       Tobacco Use    Smoking status: Not on file    Smokeless tobacco: Not on file   Substance and Sexual Activity    Alcohol use: Not on file    Drug use: Not on file    Sexual activity: Not on file     Review of Systems   Unable to perform ROS: Dementia   Constitutional:  Negative for fever.   HENT:  Negative for congestion.    Respiratory:  Negative for shortness of breath.    Gastrointestinal:  Negative for abdominal pain.   Musculoskeletal:  Positive for back pain.   Neurological:  Negative for headaches.   Psychiatric/Behavioral:  The patient is not nervous/anxious.    Objective:     Vital Signs (Most Recent):  Temp: 98.5 °F (36.9 °C) (03/31/22 1321)  Pulse: 92 (03/31/22 1321)  Resp: 18 (03/31/22 1321)  BP: (!) 179/89 (03/31/22 1321)  SpO2: 97 % (03/31/22 1321)   Vital Signs (24h Range):  Temp:  [98.5 °F (36.9 °C)] 98.5 °F (36.9 °C)  Pulse:  [92] 92  Resp:  [18] 18  SpO2:  [97 %] 97 %  BP: (179)/(89) 179/89     Weight: 60.3 kg (133 lb)  Body mass index is 20.22 kg/m².    Physical Exam  Vitals and nursing note reviewed.   Constitutional:       General: She is not in acute distress.     Appearance: Normal appearance.   HENT:      Head: Normocephalic and atraumatic.      Right Ear: External ear normal.      Left Ear: External ear normal.      Nose: Nose normal. No congestion.      Mouth/Throat:      Mouth: Mucous membranes are dry.      Pharynx: Oropharynx is clear.   Eyes:      Extraocular Movements: Extraocular movements intact.      Conjunctiva/sclera: Conjunctivae normal.   Cardiovascular:      Rate and Rhythm: Normal rate and regular rhythm.      Pulses: Normal pulses.       Heart sounds: No murmur heard.  Pulmonary:      Effort: Pulmonary effort is normal. No respiratory distress.      Breath sounds: No wheezing.   Abdominal:      General: Abdomen is flat. Bowel sounds are normal.      Palpations: Abdomen is soft.      Tenderness: There is abdominal tenderness (suprapubic). There is no guarding.   Musculoskeletal:         General: Normal range of motion.      Cervical back: Normal range of motion. No muscular tenderness.      Right lower leg: No edema.      Left lower leg: No edema.   Lymphadenopathy:      Cervical: No cervical adenopathy.   Skin:     General: Skin is warm and dry.      Capillary Refill: Capillary refill takes 2 to 3 seconds.      Findings: No erythema or rash.   Neurological:      General: No focal deficit present.      Mental Status: She is alert.      Sensory: No sensory deficit.      Coordination: Coordination normal.      Comments: Oriented to self and place not date or situation.    Psychiatric:         Mood and Affect: Mood normal.         Behavior: Behavior normal.           Significant Labs: All pertinent labs within the past 24 hours have been reviewed.  CBC:   Recent Labs   Lab 03/31/22  1417   WBC 8.82   HGB 15.1   HCT 46.3        CMP:   Recent Labs   Lab 03/31/22  1417      K 5.4*      CO2 24   *   BUN 15   CREATININE 1.1   CALCIUM 9.7   PROT 8.0   ALBUMIN 3.8   BILITOT 0.6   ALKPHOS 138*   AST 30   ALT 18   ANIONGAP 12   EGFRNONAA 50*     Troponin:   Recent Labs   Lab 03/31/22  1417   TROPONINI 0.023     TSH:   Recent Labs   Lab 03/31/22  1417   TSH 0.603     Urine Studies:   Recent Labs   Lab 03/31/22  1415   COLORU Yellow   APPEARANCEUA Clear   PHUR 6.0   SPECGRAV 1.010   PROTEINUA Negative   GLUCUA Negative   KETONESU Negative   BILIRUBINUA Negative   OCCULTUA Negative   NITRITE Positive*   UROBILINOGEN Negative   LEUKOCYTESUR 1+*   RBCUA 1   WBCUA 5   BACTERIA Many*       Significant Imaging: I have reviewed all pertinent  imaging results/findings within the past 24 hours.    Imaging Results              CT Head Without Contrast (Final result)  Result time 03/31/22 15:47:36      Final result by Pa Espitia MD (03/31/22 15:47:36)                   Impression:      No acute intracranial process.  Additional evaluation, as clinically warranted.    Changes of extensive chronic small vessel ischemic disease and cerebral volume loss.      Electronically signed by: Pa Espitia MD  Date:    03/31/2022  Time:    15:47               Narrative:    EXAMINATION:  CT HEAD WITHOUT CONTRAST    CLINICAL HISTORY:  Mental status change, unknown cause;    TECHNIQUE:  Low dose axial images were obtained through the head.  Coronal and sagittal reformations were also performed. Contrast was not administered.    COMPARISON:  None.    FINDINGS:  The subcutaneous tissues are unremarkable.  The bony calvarium is intact.  The paranasal sinuses are within normal limits.  The mastoid air cells are clear.  The orbits and intraorbital contents are within normal limits.    The craniocervical junction is intact.  There are no extra-axial fluid collections.  There is no evidence of intracranial hemorrhage.  The ventricles and sulci are prominent suggestive of cerebral volume loss.  There are extensive hypodensities within the periventricular and subcortical white matter.  The gray-white differentiation is maintained.  There are extensive calcifications of the skull base vessels.  There is no dense vessel sign.  There is no evidence of mass effect.                                       X-Ray Chest 1 View (Final result)  Result time 03/31/22 15:06:20      Final result by Ankit Vazquez MD (03/31/22 15:06:20)                   Impression:      1. Mediastinal configuration is likely related to patient positioning however consider PA and lateral for further evaluation.  No large focal consolidation.      Electronically signed by: Ankit Vazquez  MD  Date:    03/31/2022  Time:    15:06               Narrative:    EXAMINATION:  XR CHEST 1 VIEW    CLINICAL HISTORY:  Altered mental status;    TECHNIQUE:  Single frontal view of the chest was performed.    COMPARISON:  None    FINDINGS:  The cardiomediastinal silhouette is prominent noting magnification by technique.  Patient is rotated.  There is prominence of the right paratracheal region, may be on the basis of rotation.  There is atherosclerotic calcification of the aorta..  There is no pleural effusion.  The trachea is midline.  The lungs are symmetrically expanded bilaterally with minimally coarse interstitial attenuation.  No large focal consolidation seen.  There is no pneumothorax.  The osseous structures are remarkable for degenerative changes..

## 2022-03-31 NOTE — ASSESSMENT & PLAN NOTE
Sent from nursing home 2/2 being more lethargic than baseline  No hallucinations or confusion noted  CT head negative on admission  UA with leukocytes and nitrites, encephalopathy thought to be 2/2 UTI  TSH wnl    PLAN:  - Full AMS work-up with HIV, RPR, B12, folate, ammonia and hep c  - Delirium precautions  - Unclear baseline functional status, appreciate PT/OT evaluation   - Plan per UTI

## 2022-03-31 NOTE — ASSESSMENT & PLAN NOTE
UA with leukocytes and nitrites on admission  Noted to have back pain on admission  S/p 1g rocephin in the ED    PLAN:  - Appears fluid down, will give 500cc LR bolus  - Start zosyn, unable to rule-out pyelonephritis at this time  - UCx pending  - Lidocaine patch PRN for back pain

## 2022-03-31 NOTE — CARE UPDATE
Notified of elevated BP. Home amlodipine previously administered, waiting to take effect. Hydralazine 10 mg IV x1 ordered to administer now. Pt okay for floor admission. Plan for night resident to come to bedside shortly to talk with family.     Zoe Mondragon MD  [facility] PGY-3  03/31/2022

## 2022-03-31 NOTE — ASSESSMENT & PLAN NOTE
Home regimen: Metformin 1g nightly and glipizide 5mg daily    PLAN:  - Hold home medications  - MDS with goal glucose 140-180     - on same side as RIJ Broviac  - U/S Neck : Negative

## 2022-03-31 NOTE — PROGRESS NOTES
[facility]           Pharmacy        Current Drug Shortage     Due to national backorder and [facility] is critically low on inventory of Dextrose 50% (D50) Syringes and Vials, pharmacy has automatically switched from D50% to D10% IVPB at the equivalent dose until resolution of the shortage per P&T approved protocol.               Mary Branch, PharmD  [phone]

## 2022-04-01 PROBLEM — G93.40 ACUTE ENCEPHALOPATHY: Status: ACTIVE | Noted: 2022-03-31

## 2022-04-01 LAB
ALBUMIN SERPL BCP-MCNC: 3.4 G/DL (ref 3.5–5.2)
ALP SERPL-CCNC: 117 U/L (ref 55–135)
ALT SERPL W/O P-5'-P-CCNC: 11 U/L (ref 10–44)
AMMONIA PLAS-SCNC: 31 UMOL/L (ref 10–50)
ANION GAP SERPL CALC-SCNC: 11 MMOL/L (ref 8–16)
AST SERPL-CCNC: 17 U/L (ref 10–40)
BASOPHILS # BLD AUTO: 0.07 K/UL (ref 0–0.2)
BASOPHILS NFR BLD: 0.7 % (ref 0–1.9)
BILIRUB SERPL-MCNC: 0.7 MG/DL (ref 0.1–1)
BUN SERPL-MCNC: 12 MG/DL (ref 8–23)
CALCIUM SERPL-MCNC: 9 MG/DL (ref 8.7–10.5)
CHLORIDE SERPL-SCNC: 108 MMOL/L (ref 95–110)
CO2 SERPL-SCNC: 23 MMOL/L (ref 23–29)
CREAT SERPL-MCNC: 1 MG/DL (ref 0.5–1.4)
DIFFERENTIAL METHOD: ABNORMAL
EOSINOPHIL # BLD AUTO: 0.1 K/UL (ref 0–0.5)
EOSINOPHIL NFR BLD: 1.2 % (ref 0–8)
ERYTHROCYTE [DISTWIDTH] IN BLOOD BY AUTOMATED COUNT: 14.2 % (ref 11.5–14.5)
EST. GFR  (AFRICAN AMERICAN): >60 ML/MIN/1.73 M^2
EST. GFR  (NON AFRICAN AMERICAN): 56 ML/MIN/1.73 M^2
ESTIMATED AVG GLUCOSE: 203 MG/DL (ref 68–131)
FOLATE SERPL-MCNC: 9.1 NG/ML (ref 4–24)
GLUCOSE SERPL-MCNC: 247 MG/DL (ref 70–110)
HBA1C MFR BLD: 8.7 % (ref 4–5.6)
HCT VFR BLD AUTO: 40.3 % (ref 37–48.5)
HCV AB SERPL QL IA: NEGATIVE
HGB BLD-MCNC: 13 G/DL (ref 12–16)
HIV 1+2 AB+HIV1 P24 AG SERPL QL IA: NEGATIVE
IMM GRANULOCYTES # BLD AUTO: 0.03 K/UL (ref 0–0.04)
IMM GRANULOCYTES NFR BLD AUTO: 0.3 % (ref 0–0.5)
LYMPHOCYTES # BLD AUTO: 2 K/UL (ref 1–4.8)
LYMPHOCYTES NFR BLD: 21.4 % (ref 18–48)
MAGNESIUM SERPL-MCNC: 1.8 MG/DL (ref 1.6–2.6)
MCH RBC QN AUTO: 26.5 PG (ref 27–31)
MCHC RBC AUTO-ENTMCNC: 32.3 G/DL (ref 32–36)
MCV RBC AUTO: 82 FL (ref 82–98)
MONOCYTES # BLD AUTO: 0.7 K/UL (ref 0.3–1)
MONOCYTES NFR BLD: 7.2 % (ref 4–15)
NEUTROPHILS # BLD AUTO: 6.5 K/UL (ref 1.8–7.7)
NEUTROPHILS NFR BLD: 69.2 % (ref 38–73)
NRBC BLD-RTO: 0 /100 WBC
PHOSPHATE SERPL-MCNC: 3.6 MG/DL (ref 2.7–4.5)
PLATELET # BLD AUTO: 289 K/UL (ref 150–450)
PMV BLD AUTO: 10.1 FL (ref 9.2–12.9)
POCT GLUCOSE: 215 MG/DL (ref 70–110)
POCT GLUCOSE: 227 MG/DL (ref 70–110)
POCT GLUCOSE: 258 MG/DL (ref 70–110)
POCT GLUCOSE: 380 MG/DL (ref 70–110)
POCT GLUCOSE: <20 MG/DL (ref 70–110)
POTASSIUM SERPL-SCNC: 3.2 MMOL/L (ref 3.5–5.1)
PROT SERPL-MCNC: 6.8 G/DL (ref 6–8.4)
RBC # BLD AUTO: 4.9 M/UL (ref 4–5.4)
RPR SER QL: NORMAL
SODIUM SERPL-SCNC: 142 MMOL/L (ref 136–145)
VIT B12 SERPL-MCNC: 283 PG/ML (ref 210–950)
WBC # BLD AUTO: 9.34 K/UL (ref 3.9–12.7)

## 2022-04-01 PROCEDURE — 92523 SPEECH SOUND LANG COMPREHEN: CPT

## 2022-04-01 PROCEDURE — 25000003 PHARM REV CODE 250: Performed by: STUDENT IN AN ORGANIZED HEALTH CARE EDUCATION/TRAINING PROGRAM

## 2022-04-01 PROCEDURE — 97165 OT EVAL LOW COMPLEX 30 MIN: CPT

## 2022-04-01 PROCEDURE — A4216 STERILE WATER/SALINE, 10 ML: HCPCS | Performed by: STUDENT IN AN ORGANIZED HEALTH CARE EDUCATION/TRAINING PROGRAM

## 2022-04-01 PROCEDURE — 97162 PT EVAL MOD COMPLEX 30 MIN: CPT

## 2022-04-01 PROCEDURE — 92610 EVALUATE SWALLOWING FUNCTION: CPT

## 2022-04-01 PROCEDURE — 11000001 HC ACUTE MED/SURG PRIVATE ROOM

## 2022-04-01 PROCEDURE — 63600175 PHARM REV CODE 636 W HCPCS: Performed by: STUDENT IN AN ORGANIZED HEALTH CARE EDUCATION/TRAINING PROGRAM

## 2022-04-01 PROCEDURE — 83036 HEMOGLOBIN GLYCOSYLATED A1C: CPT | Performed by: STUDENT IN AN ORGANIZED HEALTH CARE EDUCATION/TRAINING PROGRAM

## 2022-04-01 PROCEDURE — 86803 HEPATITIS C AB TEST: CPT | Performed by: STUDENT IN AN ORGANIZED HEALTH CARE EDUCATION/TRAINING PROGRAM

## 2022-04-01 PROCEDURE — C1751 CATH, INF, PER/CENT/MIDLINE: HCPCS

## 2022-04-01 PROCEDURE — 82140 ASSAY OF AMMONIA: CPT | Performed by: STUDENT IN AN ORGANIZED HEALTH CARE EDUCATION/TRAINING PROGRAM

## 2022-04-01 PROCEDURE — 87389 HIV-1 AG W/HIV-1&-2 AB AG IA: CPT | Performed by: STUDENT IN AN ORGANIZED HEALTH CARE EDUCATION/TRAINING PROGRAM

## 2022-04-01 PROCEDURE — 36415 COLL VENOUS BLD VENIPUNCTURE: CPT | Performed by: STUDENT IN AN ORGANIZED HEALTH CARE EDUCATION/TRAINING PROGRAM

## 2022-04-01 PROCEDURE — 85025 COMPLETE CBC W/AUTO DIFF WBC: CPT | Performed by: STUDENT IN AN ORGANIZED HEALTH CARE EDUCATION/TRAINING PROGRAM

## 2022-04-01 PROCEDURE — 36569 INSJ PICC 5 YR+ W/O IMAGING: CPT

## 2022-04-01 PROCEDURE — 82607 VITAMIN B-12: CPT | Performed by: STUDENT IN AN ORGANIZED HEALTH CARE EDUCATION/TRAINING PROGRAM

## 2022-04-01 PROCEDURE — C9399 UNCLASSIFIED DRUGS OR BIOLOG: HCPCS | Performed by: STUDENT IN AN ORGANIZED HEALTH CARE EDUCATION/TRAINING PROGRAM

## 2022-04-01 PROCEDURE — 84100 ASSAY OF PHOSPHORUS: CPT | Performed by: STUDENT IN AN ORGANIZED HEALTH CARE EDUCATION/TRAINING PROGRAM

## 2022-04-01 PROCEDURE — 80053 COMPREHEN METABOLIC PANEL: CPT | Performed by: STUDENT IN AN ORGANIZED HEALTH CARE EDUCATION/TRAINING PROGRAM

## 2022-04-01 PROCEDURE — 25500020 PHARM REV CODE 255: Performed by: STUDENT IN AN ORGANIZED HEALTH CARE EDUCATION/TRAINING PROGRAM

## 2022-04-01 PROCEDURE — 83735 ASSAY OF MAGNESIUM: CPT | Performed by: STUDENT IN AN ORGANIZED HEALTH CARE EDUCATION/TRAINING PROGRAM

## 2022-04-01 PROCEDURE — 86592 SYPHILIS TEST NON-TREP QUAL: CPT | Performed by: STUDENT IN AN ORGANIZED HEALTH CARE EDUCATION/TRAINING PROGRAM

## 2022-04-01 PROCEDURE — 97530 THERAPEUTIC ACTIVITIES: CPT

## 2022-04-01 PROCEDURE — 82746 ASSAY OF FOLIC ACID SERUM: CPT | Performed by: STUDENT IN AN ORGANIZED HEALTH CARE EDUCATION/TRAINING PROGRAM

## 2022-04-01 RX ORDER — MUPIROCIN 20 MG/G
OINTMENT TOPICAL 2 TIMES DAILY
Status: DISCONTINUED | OUTPATIENT
Start: 2022-04-01 | End: 2022-04-05 | Stop reason: HOSPADM

## 2022-04-01 RX ORDER — KETOROLAC TROMETHAMINE 30 MG/ML
15 INJECTION, SOLUTION INTRAMUSCULAR; INTRAVENOUS EVERY 6 HOURS PRN
Status: DISPENSED | OUTPATIENT
Start: 2022-04-01 | End: 2022-04-04

## 2022-04-01 RX ORDER — MORPHINE SULFATE 2 MG/ML
2 INJECTION, SOLUTION INTRAMUSCULAR; INTRAVENOUS EVERY 6 HOURS PRN
Status: DISCONTINUED | OUTPATIENT
Start: 2022-04-01 | End: 2022-04-04

## 2022-04-01 RX ORDER — SODIUM CHLORIDE 0.9 % (FLUSH) 0.9 %
10 SYRINGE (ML) INJECTION
Status: DISCONTINUED | OUTPATIENT
Start: 2022-04-01 | End: 2022-04-05 | Stop reason: HOSPADM

## 2022-04-01 RX ORDER — SODIUM CHLORIDE 0.9 % (FLUSH) 0.9 %
10 SYRINGE (ML) INJECTION EVERY 6 HOURS
Status: DISCONTINUED | OUTPATIENT
Start: 2022-04-01 | End: 2022-04-05 | Stop reason: HOSPADM

## 2022-04-01 RX ADMIN — DEXTROSE 250 ML: 10 SOLUTION INTRAVENOUS at 08:04

## 2022-04-01 RX ADMIN — ATORVASTATIN CALCIUM 80 MG: 40 TABLET, FILM COATED ORAL at 09:04

## 2022-04-01 RX ADMIN — KETOROLAC TROMETHAMINE 15 MG: 30 INJECTION, SOLUTION INTRAMUSCULAR; INTRAVENOUS at 02:04

## 2022-04-01 RX ADMIN — FENOFIBRATE 145 MG: 145 TABLET ORAL at 09:04

## 2022-04-01 RX ADMIN — INSULIN ASPART 4 UNITS: 100 INJECTION, SOLUTION INTRAVENOUS; SUBCUTANEOUS at 04:04

## 2022-04-01 RX ADMIN — AMLODIPINE BESYLATE 10 MG: 5 TABLET ORAL at 10:04

## 2022-04-01 RX ADMIN — LISINOPRIL 40 MG: 20 TABLET ORAL at 10:04

## 2022-04-01 RX ADMIN — IOHEXOL 100 ML: 350 INJECTION, SOLUTION INTRAVENOUS at 04:04

## 2022-04-01 RX ADMIN — POTASSIUM PHOSPHATE, MONOBASIC AND POTASSIUM PHOSPHATE, DIBASIC 15 MMOL: 224; 236 INJECTION, SOLUTION, CONCENTRATE INTRAVENOUS at 10:04

## 2022-04-01 RX ADMIN — LIDOCAINE 1 PATCH: 50 PATCH CUTANEOUS at 04:04

## 2022-04-01 RX ADMIN — PIPERACILLIN AND TAZOBACTAM 4.5 G: 4; .5 INJECTION, POWDER, LYOPHILIZED, FOR SOLUTION INTRAVENOUS; PARENTERAL at 12:04

## 2022-04-01 RX ADMIN — MIRTAZAPINE 15 MG: 7.5 TABLET ORAL at 09:04

## 2022-04-01 RX ADMIN — ENOXAPARIN SODIUM 40 MG: 100 INJECTION SUBCUTANEOUS at 05:04

## 2022-04-01 RX ADMIN — INSULIN DETEMIR 5 UNITS: 100 INJECTION, SOLUTION SUBCUTANEOUS at 09:04

## 2022-04-01 RX ADMIN — CEFTRIAXONE 1 G: 1 INJECTION, SOLUTION INTRAVENOUS at 10:04

## 2022-04-01 RX ADMIN — ASPIRIN 81 MG CHEWABLE TABLET 81 MG: 81 TABLET CHEWABLE at 10:04

## 2022-04-01 RX ADMIN — SODIUM CHLORIDE, PRESERVATIVE FREE 10 ML: 5 INJECTION INTRAVENOUS at 06:04

## 2022-04-01 RX ADMIN — CLOPIDOGREL 75 MG: 75 TABLET, FILM COATED ORAL at 10:04

## 2022-04-01 RX ADMIN — MUPIROCIN: 20 OINTMENT TOPICAL at 09:04

## 2022-04-01 RX ADMIN — Medication 6 MG: at 09:04

## 2022-04-01 NOTE — CONSULTS
"NEUROLOGY FLOOR CONSULT    Reason for consult:  Stroke    Informant:  Patient and family       Other sources of information : Chart review    CC:  "Stroke"    HPI:   Renee Sultana is a 76 y.o. year old female with history HTN, HLD, DM, previous stroke with mild speech deficits, dementia and behavioral disturbances at baseline who lives in nursing home unclear if patient taking ASA was found to be altered was brought to the hospital was found have left upper and lower extremity weakness, neglect, right gaze preference. Patient symptoms started on Thursday and found to have Ischemia in right basal ganglia consulted to neurology for evaluation and management.  Patient does not ambulate at base since post hip surgery and tailbone problems.    ROS: A 12 ROS is negative except for HPI    Histories:     Allergies:  Patient has no known allergies.    Current Medications:    Current Facility-Administered Medications   Medication Dose Route Frequency Provider Last Rate Last Admin    acetaminophen tablet 650 mg  650 mg Oral Q6H PRN Joseph Vargas, DO   650 mg at 03/31/22 2158    amLODIPine tablet 10 mg  10 mg Oral Daily Joseph Vargas, DO   10 mg at 04/01/22 1009    aspirin chewable tablet 81 mg  81 mg Oral Daily Joseph Vargas, DO   81 mg at 04/01/22 1008    atorvastatin tablet 80 mg  80 mg Oral QHS Joseph Vargas, DO   80 mg at 03/31/22 2158    cefTRIAXone (ROCEPHIN) 1 g/50 mL D5W IVPB  1 g Intravenous Q24H Hi Estrada MD   Stopped at 04/01/22 1109    clopidogreL tablet 300 mg  300 mg Oral Once Gopi Wing MD        clopidogreL tablet 75 mg  75 mg Oral Daily Gopi Wing MD   75 mg at 04/01/22 1009    dextrose 10% bolus 125 mL  12.5 g Intravenous PRN Sujey Novak MD        dextrose 10% bolus 250 mL  25 g Intravenous PRN Sujey Novak MD        enoxaparin injection 40 mg  40 mg Subcutaneous Daily Joseph Vargas, DO   40 mg at 03/31/22 1744    fenofibrate tablet 145 mg  " 145 mg Oral Daily Joseph Vargas, DO   145 mg at 04/01/22 0900    glucagon (human recombinant) injection 1 mg  1 mg Intramuscular PRN Joseph Simba Vargas, DO        glucose chewable tablet 16 g  16 g Oral PRN Joseph Simba Vargas, DO        glucose chewable tablet 24 g  24 g Oral PRN Joseph Cottrell Sam, DO        hydrALAZINE injection 10 mg  10 mg Intravenous Once Joseph Simba Vargas, DO        insulin aspart U-100 pen 1-10 Units  1-10 Units Subcutaneous QID (AC + HS) PRN Joseph Simba Vargas, DO   2 Units at 03/31/22 2212    insulin detemir U-100 pen 5 Units  5 Units Subcutaneous QHS Hi Estrada MD        ketorolac injection 15 mg  15 mg Intravenous Q6H PRN Hi Estrada MD        LIDOcaine 5 % patch 1 patch  1 patch Transdermal Q24H Joseph Simba Vargas, DO   1 patch at 03/31/22 1744    lisinopriL tablet 40 mg  40 mg Oral Daily Joseph Simba Vargas, DO   40 mg at 04/01/22 1010    melatonin tablet 6 mg  6 mg Oral Nightly PRN Joseph Simba Vargas, DO        mirtazapine tablet 15 mg  15 mg Oral Nightly Joseph Simba Vargas, DO   15 mg at 03/31/22 2158    morphine injection 2 mg  2 mg Intravenous Q6H PRN Hi Estrada MD        mupirocin 2 % ointment   Nasal BID Sujey Novak MD        senna-docusate 8.6-50 mg per tablet 1 tablet  1 tablet Oral BID PRN Joseph Vargas, DO        sodium chloride 0.9% flush 10 mL  10 mL Intravenous Q6H Niemsh Hill MD        And    sodium chloride 0.9% flush 10 mL  10 mL Intravenous PRN Nimesh Hill MD        sodium chloride 0.9% flush 5 mL  5 mL Intravenous PRN Joseph Vargas, DO        trazodone split tablet 25 mg  25 mg Oral Nightly PRN Joseph Vargas, DO           Past Medical/Surgical/Family History:  Medical: History reviewed. No pertinent past medical history.   Surgeries: History reviewed. No pertinent surgical history.   Family: History reviewed. No pertinent family history.,     Social History:    Substance Abuse/Dependence  History:  Tobacco: Denies  EtOH: None  Ilicits: None    Occupational/Employment History:  Occupation: in Nursing home      Current Evaluation:     Vital Signs:   Vitals:    04/01/22 1556   BP: (!) 147/70   Pulse: 91   Resp: 20   Temp: 97.5 °F (36.4 °C)        ORIENTATION: Alert oriented to time, place and president    MEMORY: Intact on my exam    LANGUAGE: Mild dysarthria, no aphasia noted    CRANIAL NERVES:  PERRLA  Right gaze preference  VF - Left homonymous hemianopia  Facial asymmetry present  Hearing intact  Tongue and palate midline - poorly visualized  SCM/TPZ - Left head turning weak against resistenace 4/5    Left antonia neglect present    MOTOR:  Right upper extremity 4+/5  Left upper extremity  1/5 , increased tone  Right lower extremity 4+/5  Left lower extremity 1/5     Tone: increased tone left upper  No clonus    DTR'S:  3+ left biceps, brachioradialis, knee and ankle  2+ right biceps, triceps, brachioradialis and ankle    SENSORY:  Decreased sensation in left upper and lower extremity, feels heavy    CEREBELLAR/GAIT:  Finger to nose: normal with right hand, unable to perform with left hand  Gait: Not ambulate at baseline    LABORATORY STUDIES:  Recent Results (from the past 24 hour(s))   POCT glucose    Collection Time: 03/31/22  4:48 PM   Result Value Ref Range    POCT Glucose 153 (H) 70 - 110 mg/dL   POCT glucose    Collection Time: 03/31/22 10:09 PM   Result Value Ref Range    POCT Glucose 222 (H) 70 - 110 mg/dL   Comprehensive Metabolic Panel (CMP)    Collection Time: 04/01/22  2:54 AM   Result Value Ref Range    Sodium 142 136 - 145 mmol/L    Potassium 3.2 (L) 3.5 - 5.1 mmol/L    Chloride 108 95 - 110 mmol/L    CO2 23 23 - 29 mmol/L    Glucose 247 (H) 70 - 110 mg/dL    BUN 12 8 - 23 mg/dL    Creatinine 1.0 0.5 - 1.4 mg/dL    Calcium 9.0 8.7 - 10.5 mg/dL    Total Protein 6.8 6.0 - 8.4 g/dL    Albumin 3.4 (L) 3.5 - 5.2 g/dL    Total Bilirubin 0.7 0.1 - 1.0 mg/dL    Alkaline Phosphatase 117 55 - 135  U/L    AST 17 10 - 40 U/L    ALT 11 10 - 44 U/L    Anion Gap 11 8 - 16 mmol/L    eGFR if African American >60 >60 mL/min/1.73 m^2    eGFR if non African American 56 (A) >60 mL/min/1.73 m^2   Magnesium    Collection Time: 04/01/22  2:54 AM   Result Value Ref Range    Magnesium 1.8 1.6 - 2.6 mg/dL   Phosphorus    Collection Time: 04/01/22  2:54 AM   Result Value Ref Range    Phosphorus 3.6 2.7 - 4.5 mg/dL   CBC with Automated Differential    Collection Time: 04/01/22  2:54 AM   Result Value Ref Range    WBC 9.34 3.90 - 12.70 K/uL    RBC 4.90 4.00 - 5.40 M/uL    Hemoglobin 13.0 12.0 - 16.0 g/dL    Hematocrit 40.3 37.0 - 48.5 %    MCV 82 82 - 98 fL    MCH 26.5 (L) 27.0 - 31.0 pg    MCHC 32.3 32.0 - 36.0 g/dL    RDW 14.2 11.5 - 14.5 %    Platelets 289 150 - 450 K/uL    MPV 10.1 9.2 - 12.9 fL    Immature Granulocytes 0.3 0.0 - 0.5 %    Gran # (ANC) 6.5 1.8 - 7.7 K/uL    Immature Grans (Abs) 0.03 0.00 - 0.04 K/uL    Lymph # 2.0 1.0 - 4.8 K/uL    Mono # 0.7 0.3 - 1.0 K/uL    Eos # 0.1 0.0 - 0.5 K/uL    Baso # 0.07 0.00 - 0.20 K/uL    nRBC 0 0 /100 WBC    Gran % 69.2 38.0 - 73.0 %    Lymph % 21.4 18.0 - 48.0 %    Mono % 7.2 4.0 - 15.0 %    Eosinophil % 1.2 0.0 - 8.0 %    Basophil % 0.7 0.0 - 1.9 %    Differential Method Automated    Hemoglobin A1c    Collection Time: 04/01/22  2:54 AM   Result Value Ref Range    Hemoglobin A1C 8.7 (H) 4.0 - 5.6 %    Estimated Avg Glucose 203 (H) 68 - 131 mg/dL   Hepatitis C Antibody    Collection Time: 04/01/22  2:54 AM   Result Value Ref Range    Hepatitis C Ab Negative Negative   Vitamin B12    Collection Time: 04/01/22  2:54 AM   Result Value Ref Range    Vitamin B-12 283 210 - 950 pg/mL   Folate    Collection Time: 04/01/22  2:54 AM   Result Value Ref Range    Folate 9.1 4.0 - 24.0 ng/mL   HIV 1/2 Ag/Ab (4th Gen)    Collection Time: 04/01/22  2:54 AM   Result Value Ref Range    HIV 1/2 Ag/Ab Negative Negative   RPR    Collection Time: 04/01/22  2:54 AM   Result Value Ref Range    RPR  "Non-reactive Non-reactive   Ammonia    Collection Time: 04/01/22  2:54 AM   Result Value Ref Range    Ammonia 31 10 - 50 umol/L   POCT glucose    Collection Time: 04/01/22  7:02 AM   Result Value Ref Range    POCT Glucose 227 (H) 70 - 110 mg/dL   Echo Saline Bubble? Yes    Collection Time: 04/01/22  9:32 AM   Result Value Ref Range    AV mean gradient 8 mmHg    Ao peak alex 1.68 m/s    Ao VTI 32.52 cm    IVRT 137.01 msec    IVS 0.83 0.6 - 1.1 cm    LA size 3.76 cm    Left Atrium Major Axis 3.52 cm    Left Atrium Minor Axis 4.34 cm    LVIDd 4.06 3.5 - 6.0 cm    LVIDs 2.92 2.1 - 4.0 cm    LVOT diameter 1.76 cm    LVOT peak VTI 25.33 cm    Posterior Wall 0.92 0.6 - 1.1 cm    MV Peak A Alex 1.42 m/s    E wave deceleration time 685.50 msec    MV Peak E Alex 0.71 m/s    PV Peak D Alex 0.22 m/s    PV Peak S Alex 0.33 m/s    RA Major Axis 3.14 cm    RA Width 2.64 cm    RVDD 2.53 cm    TAPSE 2.11 cm    TDI LATERAL 0.06 m/s    TDI SEPTAL 0.04 m/s    LA WIDTH 3.43 cm    Ao root annulus 3.21 cm    MV stenosis pressure 1/2 time 198.80 ms    LV Diastolic Volume 72.54 mL    LV Systolic Volume 32.87 mL    LVOT peak alex 1.36 m/s    LA volume (mod) 28.98 cm3    MV "A" wave duration 11.80 msec    RV S' 7.67 cm/s    LV LATERAL E/E' RATIO 11.83 m/s    LV SEPTAL E/E' RATIO 17.75 m/s    FS 28 %    LA volume 42.61 cm3    LV mass 108.09 g    Left Ventricle Relative Wall Thickness 0.45 cm    AV valve area 1.89 cm2    AV Velocity Ratio 0.81     AV index (prosthetic) 0.78     MV valve area p 1/2 method 1.11 cm2    E/A ratio 0.50     Mean e' 0.05 m/s    Pulm vein S/D ratio 1.50     LVOT area 2.4 cm2    LVOT stroke volume 61.59 cm3    AV peak gradient 11 mmHg    E/E' ratio 14.20 m/s    LV Systolic Volume Index 18.8 mL/m2    LV Diastolic Volume Index 41.45 mL/m2    LA Volume Index 24.4 mL/m2    LV Mass Index 62 g/m2    LA Volume Index (Mod) 16.6 mL/m2    BSA 1.74 m2   POCT glucose    Collection Time: 04/01/22 11:26 AM   Result Value Ref Range    POCT " Glucose 258 (H) 70 - 110 mg/dL   POCT glucose    Collection Time: 04/01/22  3:56 PM   Result Value Ref Range    POCT Glucose 215 (H) 70 - 110 mg/dL       Thyroid normal  HgA1C%:  8.7  Vit B12: 283  Folate:  9.1    RADIOLOGY STUDIES:  I have personally reviewed the images performed.     HEAD CT: Hypodensity in right basal ganglia. No fractures or midline shift noted.    BRAIN MRI: Acute ischemic stroke in distribution of Anterior choroidal artery involving right corona radiata and basal ganglia region       Assessment:  P     75 y/o female with history of stroke and slurred speech, HTN, DM, HLD came with complaints of AMS, left hemiparesis and neglect, right gaze preference and left homonymous hemianopia that started on Wednesday/Thurday with acute stroke on MRI brain evaluated by Neurology. Patient out of window for thrombolysis or any endovascular interventions.     Anterior Choroidal Artery Stroke:  - 2/2 Cardioembolic vs Small vessel stroke  - CTA in process  - TTE  In process  - MRS -4 at baseline  - Continue ASA 81 and Plavix 75, received Plavix load dose of 300 mg  - Continue Lipitor 80 mg, follow up with lipid panel  - A1c - 8.7, management per primary team  - Goal BP <130/90 keep MAPS>65  - Normal TSH, B12- 283, Foloat 9.1  - Recommend Vit B12 supplementation borderline low  - Electrolyte replacement per primary team  - Follow up PT/OT/SLP    Case discussed with Dr. Alexis    Will follow for additional input regarding management of current neurologic condition and monitor for any new signs/symptoms to suggest neurologic detrimental changes.     Appreciate the consult.     Marco Alexis MD  [employer] Neurology PGY-4

## 2022-04-01 NOTE — ASSESSMENT & PLAN NOTE
Home regimen: Metformin 1g nightly and glipizide 5mg daily    PLAN:  - Hold home medications  - MDS with goal glucose 140-180

## 2022-04-01 NOTE — PLAN OF CARE
Problem: SLP  Goal: SLP Goal  Description: Short Term Goals:  1. Pt will participate in ongoing swallow assessment to determine least restrictive diet.   2. Pt will tolerate mech soft tray/HONEY thick liquids with no audible s/s of dysphagia and good oral clearance.   3. Pt will consume % of mech soft diet with HONEY  liquids without overt s/s of aspiration given min assist.   4. MBS to be completed when pt is medically appropriate for transfer to radiology to objectively r/o aspiration  5. Pt will state personal orientation to place, time, situation and year with min cues  6. Pt will perform oral motor facial ex program to increase oral motor strength and coordination for speech and swallowing.  7. Pt will perform swallowing compensatory strategies with good ability given mod cues for overall improved swallowing function and safety while swallowing.   8. Pt/family with demonstrate understanding on use of thickener, swallow strategies, and aspiration risks.   9. Given SLP model and instructions, pt will utilize speech strategies to improve connected speech in all contexts with mod cues.   Further goals to be delineated pending pt's overall progress.       Outcome: Ongoing, Progressing   ST eval completed including clinical swallow eval. Pt presents with audible dysphagia signs at bedside on thin liquids, nectar and pureed textures. Pt with signficant oral motor weakness and visual spatial deficits likely associated with MRI findings for acute R CVA impacting 3 areas of the brain. Pt with dysarthric speech, R gaze preference and poor attention overall. Pt was living at [facility] for the last 6 years. Pt has 3 adult children. Merle is patients dtr and POA. All aspiration education was provided to dtr during assessment and risks with modified and thickened liquid diet. Pt may initiate MECH soft and HONEY Thick liquids with thickener to be added to liquids. REC: Palliative Med consult to delineate goals  of care and discuss disease path trajectory with new onset of CVA which will impact pt's communication, swallowing and level of mobility which has been altered since 1st CVA in 2014. SLP will follow up while on acute unit.

## 2022-04-01 NOTE — PROGRESS NOTES
[facility] Medicine  Progress Note    Patient Name: Renee Sultana  MRN: [id number]  Patient Class: IP- Inpatient   Admission Date: 3/31/2022  Length of Stay: 0 days  Attending Physician: Sujey Novak MD  Primary Care Provider: Jarred Miramontes MD        Subjective:     Principal Problem:Acute encephalopathy        HPI:  Renee Sultana is a 76 y.o. female with PMH MDD, dementia without behavioral disturbance, HTN, HLD, T2DM who was sent from nursing home with concern for altered mental status. Per report given to ED provider pt noted to be more lethargic than usual at nursing home. On exam pt only complaint is midline low back pain.     In the ED pt hypertensive, all other VSS. UA with positive leukocytes and nitrites. CBC and CMP unremarkable aside from potassium 5.4. CT head without acute process. TSH, troponin, BNP and CK wnl. Pt given dose of IV ceftriaxone and [facility] consulted for admission.       Overview/Hospital Course:  No notes on file    Interval History:  No acute events overnight however patient's MRI revealed acute right-sided infarct.  It was also revealed that her current neurological defects and mental status were not her baseline as previously reported to EMS. Patient was found to have left antonia-neglect as well as near total left hemiparesis of the upper and lower extremities.  Patient has normal sensation to the face however decreased sensation to left upper and lower extremities only responding to deep pressure.  Also reconfirmed with medical power of  that patient does not desire NGT for nutrition. Pt also failed bedside swallow study, will await official SLP eval    Review of Systems   Constitutional:  Negative for chills, fever and unexpected weight change.   HENT:  Positive for trouble swallowing. Negative for congestion, rhinorrhea, sneezing and sore throat.    Eyes:  Negative for redness and visual disturbance.   Respiratory:  Negative for cough,  shortness of breath and wheezing.    Cardiovascular:  Negative for chest pain and leg swelling.   Gastrointestinal:  Negative for abdominal pain, blood in stool, constipation and diarrhea.   Genitourinary:  Negative for dysuria and hematuria.   Musculoskeletal:  Positive for back pain and neck pain. Negative for arthralgias and joint swelling.   Skin:  Negative for color change and pallor.   Neurological:  Positive for facial asymmetry, speech difficulty and weakness. Negative for light-headedness and headaches.   Psychiatric/Behavioral:  Negative for agitation and confusion.    Objective:     Vital Signs (Most Recent):  Temp: 97.5 °F (36.4 °C) (04/01/22 0741)  Pulse: 80 (04/01/22 0741)  Resp: 20 (04/01/22 0741)  BP: (!) 171/81 (04/01/22 0741)  SpO2: 97 % (04/01/22 0741)   Vital Signs (24h Range):  Temp:  [96.5 °F (35.8 °C)-98.5 °F (36.9 °C)] 97.5 °F (36.4 °C)  Pulse:  [] 80  Resp:  [16-21] 20  SpO2:  [94 %-98 %] 97 %  BP: (134-196)/() 171/81     Weight: 63 kg (139 lb)  Body mass index is 21.13 kg/m².    Intake/Output Summary (Last 24 hours) at 4/1/2022 1055  Last data filed at 4/1/2022 0700  Gross per 24 hour   Intake 50 ml   Output 400 ml   Net -350 ml      Physical Exam  Vitals and nursing note reviewed.   Constitutional:       Appearance: Normal appearance.   HENT:      Head: Normocephalic and atraumatic.      Right Ear: Tympanic membrane normal.      Left Ear: Tympanic membrane normal.      Mouth/Throat:      Mouth: Mucous membranes are moist.      Pharynx: Oropharynx is clear.   Eyes:      General: Visual field deficit present.      Extraocular Movements: Extraocular movements intact.      Pupils: Pupils are equal, round, and reactive to light.   Cardiovascular:      Rate and Rhythm: Normal rate and regular rhythm.      Pulses: Normal pulses.      Heart sounds: Normal heart sounds.   Pulmonary:      Effort: Pulmonary effort is normal.      Breath sounds: Normal breath sounds.   Abdominal:       General: Abdomen is flat.      Palpations: Abdomen is soft.   Musculoskeletal:         General: Normal range of motion.      Cervical back: Normal range of motion.   Skin:     General: Skin is warm.   Neurological:      General: No focal deficit present.      Mental Status: She is alert and oriented to person, place, and time.      GCS: GCS eye subscore is 4. GCS verbal subscore is 5. GCS motor subscore is 6.      Cranial Nerves: Cranial nerve deficit and facial asymmetry present.      Sensory: Sensory deficit present.      Motor: Weakness present.      Coordination: Coordination abnormal. Finger-Nose-Finger Test abnormal.      Deep Tendon Reflexes: Babinski sign absent on the right side. Babinski sign absent on the left side.      Reflex Scores:       Tricep reflexes are 4+ on the left side.       Bicep reflexes are 3+ on the right side and 4+ on the left side.       Brachioradialis reflexes are 3+ on the right side and 4+ on the left side.       Patellar reflexes are 3+ on the right side and 4+ on the left side.       Achilles reflexes are 3+ on the right side and 4+ on the left side.     Comments: Complete L sided hemiparesis  Normal sensation to face and R extremities.   No sensation to light touch in L extremities, Sensation to heavy squeeze in LLE  Could not assess pronator drift 2/2 weakness  Gait defferred   Psychiatric:         Mood and Affect: Mood normal.       Significant Labs: All pertinent labs within the past 24 hours have been reviewed.  A1C:   Recent Labs   Lab 04/01/22  0254   HGBA1C 8.7*     CBC:   Recent Labs   Lab 03/31/22  1417 04/01/22  0254   WBC 8.82 9.34   HGB 15.1 13.0   HCT 46.3 40.3    289     CMP:   Recent Labs   Lab 03/31/22  1417 04/01/22  0254    142   K 5.4* 3.2*    108   CO2 24 23   * 247*   BUN 15 12   CREATININE 1.1 1.0   CALCIUM 9.7 9.0   PROT 8.0 6.8   ALBUMIN 3.8 3.4*   BILITOT 0.6 0.7   ALKPHOS 138* 117   AST 30 17   ALT 18 11   ANIONGAP 12 11    EGFRNONAA 50* 56*       Significant Imaging: I have reviewed all pertinent imaging results/findings within the past 24 hours.  MRI    Imaging Results              MRI Brain Without Contrast (Final result)  Result time 03/31/22 23:17:23      Final result by Pa Espitia MD (03/31/22 23:17:23)                   Impression:      Acute infarction in the right corona radiata with extension into the right basal ganglia and also involvement of the right hippocampus.    The hippocampal component may represent superimposed encephalitis.  Suggest clinical correlation and short-term follow-up.    Extensive motion limited examination.    Case discussed with Dr. Wing on 03/31/2022 at 23:16.      Electronically signed by: Pa Espitia MD  Date:    03/31/2022  Time:    23:17               Narrative:    EXAMINATION:  MRI BRAIN WITHOUT CONTRAST    CLINICAL HISTORY:  Mental status change, unknown cause;    TECHNIQUE:  Multiplanar multisequence MR imaging of the brain was performed without contrast.  There are significant motion limitations to the examination.    COMPARISON:  CT head dated 03/31/2022.    FINDINGS:  The craniocervical junction is intact.  There is diffusion restriction along the posterior aspect of the right corona radiata with extension to the right basal ganglia.  There is some component of involvement of the right hippocampus.    The remainder of the examination is significantly degraded secondary to motion.  There are T2/FLAIR signal hyperintense within the periventricular and subcortical white matter.  No definitive extra-axial fluid collection is identified.  There is no evidence of significant mass effect.                                       CT Head Without Contrast (Final result)  Result time 03/31/22 15:47:36      Final result by Pa Espitia MD (03/31/22 15:47:36)                   Impression:      No acute intracranial process.  Additional evaluation, as clinically warranted.    Changes of extensive  chronic small vessel ischemic disease and cerebral volume loss.      Electronically signed by: Pa Espitia MD  Date:    03/31/2022  Time:    15:47               Narrative:    EXAMINATION:  CT HEAD WITHOUT CONTRAST    CLINICAL HISTORY:  Mental status change, unknown cause;    TECHNIQUE:  Low dose axial images were obtained through the head.  Coronal and sagittal reformations were also performed. Contrast was not administered.    COMPARISON:  None.    FINDINGS:  The subcutaneous tissues are unremarkable.  The bony calvarium is intact.  The paranasal sinuses are within normal limits.  The mastoid air cells are clear.  The orbits and intraorbital contents are within normal limits.    The craniocervical junction is intact.  There are no extra-axial fluid collections.  There is no evidence of intracranial hemorrhage.  The ventricles and sulci are prominent suggestive of cerebral volume loss.  There are extensive hypodensities within the periventricular and subcortical white matter.  The gray-white differentiation is maintained.  There are extensive calcifications of the skull base vessels.  There is no dense vessel sign.  There is no evidence of mass effect.                                       X-Ray Chest 1 View (Final result)  Result time 03/31/22 15:06:20      Final result by Ankit Vazquez MD (03/31/22 15:06:20)                   Impression:      1. Mediastinal configuration is likely related to patient positioning however consider PA and lateral for further evaluation.  No large focal consolidation.      Electronically signed by: Ankit Vazquez MD  Date:    03/31/2022  Time:    15:06               Narrative:    EXAMINATION:  XR CHEST 1 VIEW    CLINICAL HISTORY:  Altered mental status;    TECHNIQUE:  Single frontal view of the chest was performed.    COMPARISON:  None    FINDINGS:  The cardiomediastinal silhouette is prominent noting magnification by technique.  Patient is rotated.  There is prominence of the  right paratracheal region, may be on the basis of rotation.  There is atherosclerotic calcification of the aorta..  There is no pleural effusion.  The trachea is midline.  The lungs are symmetrically expanded bilaterally with minimally coarse interstitial attenuation.  No large focal consolidation seen.  There is no pneumothorax.  The osseous structures are remarkable for degenerative changes..                                          Assessment/Plan:      * Acute encephalopathy  Sent from nursing home 2/2 being more lethargic than baseline  No hallucinations or confusion noted  CT head shows changes of extensive chronic small vessel ischemic disease and cerebral volume loss.UA with leukocytes and nitrites, encephalopathy initially thought to be 2/2 UTI  TSH wnl  MRI shows large acute infarction in the right corona radiata with extension into the right basal ganglia and also involvement of the right hippocampus.  The hippocampal component may represent superimposed encephalitis  Left Hemiparesis is decidedly not baseline per family who states noticed change between Tuesday and wedenesday    PLAN:  - CTA head and neck  - Consult Neurology appreciate recs  - Full AMS work-up with HIV, RPR, B12, folate, ammonia and hep c  - Delirium precautions  - Awaiting SLP eval for swallow study  - Will also treat UTI      Allergic rhinitis  Hold home claritin 10mg as could be contributing to AMS      Mixed hyperlipidemia  Continue home lipitor and fenofibrate       Type 2 diabetes mellitus, without long-term current use of insulin  Home regimen: Metformin 1g nightly and glipizide 5mg daily    PLAN:  - Hold home medications  - MDSSI with goal glucose 140-180      Dementia without behavioral disturbance  - Unknown baseline, A&O to self and place only on admission  - Delirium precautions, avoid beers criteria medications  - Continue baseline mechanical soft diet   - Pt is DNR per [place] sent from nursing home. Comfort measures only  except for antibiotics if antibiotics can prolong life.       Mild episode of recurrent major depressive disorder  - Continue home remeron 15mg nightly  - Hold home trazodone 12.5mg as dose unavailable in the hospital and could be contributing to AMS  - Hold home ativan 0.5mg BID PRN, will monitor for signs of benzo withdrawal with q4hr CIWA      Primary hypertension  - Continue home amlodipine 10mg, lisinopril 40mg and ASA 81mg  - Hold home clonidine patch as dose not written on nursing home orders      Acute cystitis with hematuria  UA with leukocytes and nitrites on admission  Noted to have back pain on admission  S/p 1g rocephin in the ED    PLAN:  - Appears fluid down, will give 500cc LR bolus  - Start zosyn, unable to rule-out pyelonephritis at this time  - UCx pending  - Lidocaine patch PRN for back pain        VTE Risk Mitigation (From admission, onward)         Ordered     enoxaparin injection 40 mg  Daily         03/31/22 1549     IP VTE HIGH RISK PATIENT  Once         03/31/22 1549     Place sequential compression device  Until discontinued         03/31/22 1549                Discharge Planning   JOSEF:      Code Status: DNR   Is the patient medically ready for discharge?:     Reason for patient still in hospital (select all that apply): Treatment, Imaging and Consult recommendations           Hi Estrada MD  Department of Hospital Medicine   [place] [facility]

## 2022-04-01 NOTE — ASSESSMENT & PLAN NOTE
Sent from nursing home 2/2 being more lethargic than baseline  No hallucinations or confusion noted  CT head shows changes of extensive chronic small vessel ischemic disease and cerebral volume loss.UA with leukocytes and nitrites, encephalopathy initially thought to be 2/2 UTI  TSH wnl  MRI shows large acute infarction in the right corona radiata with extension into the right basal ganglia and also involvement of the right hippocampus.  The hippocampal component may represent superimposed encephalitis  Left Hemiparesis is decidedly not baseline per family who states noticed change between Tuesday and wedenesday    PLAN:  - CTA head and neck  - Consult Neurology appreciate recs  - Full AMS work-up with HIV, RPR, B12, folate, ammonia and hep c  - Delirium precautions  - Awaiting SLP eval for swallow study  - Will also treat UTI

## 2022-04-01 NOTE — ED NOTES
Spoke with doctor when she arrived to check on pt. Daughter states that left side weakness is new and that patient normal gets up in her wheel chair with assistance at the nursing home. Last time the family saw her at her was Sunday and she was at her normal baseline.Daughter states that pt has bilateral leg weakness and slurred speech since previous stroke.

## 2022-04-01 NOTE — SUBJECTIVE & OBJECTIVE
Interval History:  No acute events overnight however patient's MRI revealed acute right-sided infarct.  It was also revealed that her current neurological defects and mental status were not her baseline as previously reported to EMS. Patient was found to have left antonia-neglect as well as near total left hemiparesis of the upper and lower extremities.  Patient has normal sensation to the face however decreased sensation to left upper and lower extremities only responding to deep pressure.  Also reconfirmed with medical power of  that patient does not desire NGT for nutrition. Pt also failed bedside swallow study, will await official SLP eval    Review of Systems   Constitutional:  Negative for chills, fever and unexpected weight change.   HENT:  Positive for trouble swallowing. Negative for congestion, rhinorrhea, sneezing and sore throat.    Eyes:  Negative for redness and visual disturbance.   Respiratory:  Negative for cough, shortness of breath and wheezing.    Cardiovascular:  Negative for chest pain and leg swelling.   Gastrointestinal:  Negative for abdominal pain, blood in stool, constipation and diarrhea.   Genitourinary:  Negative for dysuria and hematuria.   Musculoskeletal:  Positive for back pain and neck pain. Negative for arthralgias and joint swelling.   Skin:  Negative for color change and pallor.   Neurological:  Positive for facial asymmetry, speech difficulty and weakness. Negative for light-headedness and headaches.   Psychiatric/Behavioral:  Negative for agitation and confusion.    Objective:     Vital Signs (Most Recent):  Temp: 97.5 °F (36.4 °C) (04/01/22 0741)  Pulse: 80 (04/01/22 0741)  Resp: 20 (04/01/22 0741)  BP: (!) 171/81 (04/01/22 0741)  SpO2: 97 % (04/01/22 0741)   Vital Signs (24h Range):  Temp:  [96.5 °F (35.8 °C)-98.5 °F (36.9 °C)] 97.5 °F (36.4 °C)  Pulse:  [] 80  Resp:  [16-21] 20  SpO2:  [94 %-98 %] 97 %  BP: (134-196)/() 171/81     Weight: 63 kg (139  lb)  Body mass index is 21.13 kg/m².    Intake/Output Summary (Last 24 hours) at 4/1/2022 1055  Last data filed at 4/1/2022 0700  Gross per 24 hour   Intake 50 ml   Output 400 ml   Net -350 ml      Physical Exam  Vitals and nursing note reviewed.   Constitutional:       Appearance: Normal appearance.   HENT:      Head: Normocephalic and atraumatic.      Right Ear: Tympanic membrane normal.      Left Ear: Tympanic membrane normal.      Mouth/Throat:      Mouth: Mucous membranes are moist.      Pharynx: Oropharynx is clear.   Eyes:      General: Visual field deficit present.      Extraocular Movements: Extraocular movements intact.      Pupils: Pupils are equal, round, and reactive to light.   Cardiovascular:      Rate and Rhythm: Normal rate and regular rhythm.      Pulses: Normal pulses.      Heart sounds: Normal heart sounds.   Pulmonary:      Effort: Pulmonary effort is normal.      Breath sounds: Normal breath sounds.   Abdominal:      General: Abdomen is flat.      Palpations: Abdomen is soft.   Musculoskeletal:         General: Normal range of motion.      Cervical back: Normal range of motion.   Skin:     General: Skin is warm.   Neurological:      General: No focal deficit present.      Mental Status: She is alert and oriented to person, place, and time.      GCS: GCS eye subscore is 4. GCS verbal subscore is 5. GCS motor subscore is 6.      Cranial Nerves: Cranial nerve deficit and facial asymmetry present.      Sensory: Sensory deficit present.      Motor: Weakness present.      Coordination: Coordination abnormal. Finger-Nose-Finger Test abnormal.      Deep Tendon Reflexes: Babinski sign absent on the right side. Babinski sign absent on the left side.      Reflex Scores:       Tricep reflexes are 4+ on the left side.       Bicep reflexes are 3+ on the right side and 4+ on the left side.       Brachioradialis reflexes are 3+ on the right side and 4+ on the left side.       Patellar reflexes are 3+ on the  right side and 4+ on the left side.       Achilles reflexes are 3+ on the right side and 4+ on the left side.     Comments: Complete L sided hemiparesis  Normal sensation to face and R extremities.   No sensation to light touch in L extremities, Sensation to heavy squeeze in LLE  Could not assess pronator drift 2/2 weakness  Gait defferred   Psychiatric:         Mood and Affect: Mood normal.       Significant Labs: All pertinent labs within the past 24 hours have been reviewed.  A1C:   Recent Labs   Lab 04/01/22  0254   HGBA1C 8.7*     CBC:   Recent Labs   Lab 03/31/22 1417 04/01/22 0254   WBC 8.82 9.34   HGB 15.1 13.0   HCT 46.3 40.3    289     CMP:   Recent Labs   Lab 03/31/22 1417 04/01/22 0254    142   K 5.4* 3.2*    108   CO2 24 23   * 247*   BUN 15 12   CREATININE 1.1 1.0   CALCIUM 9.7 9.0   PROT 8.0 6.8   ALBUMIN 3.8 3.4*   BILITOT 0.6 0.7   ALKPHOS 138* 117   AST 30 17   ALT 18 11   ANIONGAP 12 11   EGFRNONAA 50* 56*       Significant Imaging: I have reviewed all pertinent imaging results/findings within the past 24 hours.  MRI    Imaging Results              MRI Brain Without Contrast (Final result)  Result time 03/31/22 23:17:23      Final result by Pa Espitia MD (03/31/22 23:17:23)                   Impression:      Acute infarction in the right corona radiata with extension into the right basal ganglia and also involvement of the right hippocampus.    The hippocampal component may represent superimposed encephalitis.  Suggest clinical correlation and short-term follow-up.    Extensive motion limited examination.    Case discussed with Dr. Wing on 03/31/2022 at 23:16.      Electronically signed by: Pa Espitia MD  Date:    03/31/2022  Time:    23:17               Narrative:    EXAMINATION:  MRI BRAIN WITHOUT CONTRAST    CLINICAL HISTORY:  Mental status change, unknown cause;    TECHNIQUE:  Multiplanar multisequence MR imaging of the brain was performed without  contrast.  There are significant motion limitations to the examination.    COMPARISON:  CT head dated 03/31/2022.    FINDINGS:  The craniocervical junction is intact.  There is diffusion restriction along the posterior aspect of the right corona radiata with extension to the right basal ganglia.  There is some component of involvement of the right hippocampus.    The remainder of the examination is significantly degraded secondary to motion.  There are T2/FLAIR signal hyperintense within the periventricular and subcortical white matter.  No definitive extra-axial fluid collection is identified.  There is no evidence of significant mass effect.                                       CT Head Without Contrast (Final result)  Result time 03/31/22 15:47:36      Final result by Pa Espitia MD (03/31/22 15:47:36)                   Impression:      No acute intracranial process.  Additional evaluation, as clinically warranted.    Changes of extensive chronic small vessel ischemic disease and cerebral volume loss.      Electronically signed by: Pa Espitia MD  Date:    03/31/2022  Time:    15:47               Narrative:    EXAMINATION:  CT HEAD WITHOUT CONTRAST    CLINICAL HISTORY:  Mental status change, unknown cause;    TECHNIQUE:  Low dose axial images were obtained through the head.  Coronal and sagittal reformations were also performed. Contrast was not administered.    COMPARISON:  None.    FINDINGS:  The subcutaneous tissues are unremarkable.  The bony calvarium is intact.  The paranasal sinuses are within normal limits.  The mastoid air cells are clear.  The orbits and intraorbital contents are within normal limits.    The craniocervical junction is intact.  There are no extra-axial fluid collections.  There is no evidence of intracranial hemorrhage.  The ventricles and sulci are prominent suggestive of cerebral volume loss.  There are extensive hypodensities within the periventricular and subcortical white matter.   The gray-white differentiation is maintained.  There are extensive calcifications of the skull base vessels.  There is no dense vessel sign.  There is no evidence of mass effect.                                       X-Ray Chest 1 View (Final result)  Result time 03/31/22 15:06:20      Final result by Ankit Vazquez MD (03/31/22 15:06:20)                   Impression:      1. Mediastinal configuration is likely related to patient positioning however consider PA and lateral for further evaluation.  No large focal consolidation.      Electronically signed by: Ankit Vazquez MD  Date:    03/31/2022  Time:    15:06               Narrative:    EXAMINATION:  XR CHEST 1 VIEW    CLINICAL HISTORY:  Altered mental status;    TECHNIQUE:  Single frontal view of the chest was performed.    COMPARISON:  None    FINDINGS:  The cardiomediastinal silhouette is prominent noting magnification by technique.  Patient is rotated.  There is prominence of the right paratracheal region, may be on the basis of rotation.  There is atherosclerotic calcification of the aorta..  There is no pleural effusion.  The trachea is midline.  The lungs are symmetrically expanded bilaterally with minimally coarse interstitial attenuation.  No large focal consolidation seen.  There is no pneumothorax.  The osseous structures are remarkable for degenerative changes..

## 2022-04-01 NOTE — PLAN OF CARE
Problem: Occupational Therapy  Goal: Occupational Therapy Goal  Description: Goals to be met by: 5/1/22     Patient will increase functional independence with ADLs by performing:    Feeding with Stand-by Assistance.  UE Dressing with Moderate Assistance.  Grooming while seated with Stand-by Assistance.  Sitting at edge of bed x10 minutes with Modified [place].  Supine to sit with Minimal Assistance.  Squat pivot transfers with Moderate Assistance.  Toilet transfer to bedside commode with Moderate Assistance.  Upper extremity exercise program x10 reps per handout, with assistance as needed.    Outcome: Ongoing, Progressing       Pt would benefit from cont OT services in order to maximize functional independence. Recommending SNF at d/c. Pt currently demonstrates L side neglect, impaired visual field, unable to scan entire L field, L hemiparesis with LUE tone, slurred speech and impaired sensation to L side. Will benefit from daily therapy.

## 2022-04-01 NOTE — PT/OT/SLP EVAL
"Physical Therapy Evaluation    Patient Name:  Renee Sultana   MRN:  [id number]    Recommendations:     Discharge Recommendations:  nursing facility, skilled   Discharge Equipment Recommendations:  (defer to NH)   Barriers to Discharge: None    Assessment:     Renee Sultana is a 76 y.o. female admitted with a medical diagnosis of Encephalopathy, metabolic.  She presents with the following impairments/functional limitations:  weakness, impaired endurance, impaired sensation, impaired self care skills, impaired functional mobilty, gait instability, impaired balance, impaired cognition, decreased coordination, decreased upper extremity function, decreased lower extremity function, decreased safety awareness, pain, abnormal tone, decreased ROM, impaired coordination, impaired fine motor. Pt presenting with L sided hemiparesis with UE/LE tone, impaired sensation to L side, L neglect with minimal scanning to L visual field and primarily only gaze to R visual field, and visual deficits that are difficult to assess at this time. Pt would benefit from SNF placement upon d/c.     Rehab Prognosis: Fair; patient would benefit from acute skilled PT services to address these deficits and reach maximum level of function.    Recent Surgery: * No surgery found *      Plan:     During this hospitalization, patient to be seen 3 x/week to address the identified rehab impairments via therapeutic activities, therapeutic exercises, neuromuscular re-education, wheelchair management/training and progress toward the following goals:    · Plan of Care Expires:  05/01/22    Subjective     Chief Complaint: reporting abdominal discomfort at start of session when asked but otherwise did not report  Patient/Family Comments/goals: when asked to move her LUE or LLE pt reporting "I can't it's heavy"  Pain/Comfort:  · Pain Rating 1:  (not rated but stated abodminal pain/discomfort)  · Location 1: abdomen  · Pain Addressed 1: Reposition, " Distraction    Patients cultural, spiritual, Congregation conflicts given the current situation:      Living Environment:  Pt is a resident at [facility].  Prior to admission, patients level of function was - pt reporting she was non-ambulatory but able to self-propel her w/c, unsure of level of assist required for bed mobility and transfers to/from w/c. Pt wears a brief as she is incontinent. Pt's son reporting pt with slurred speech at baseline but some days it is easier to understand her than others. Equipment used at home: wheelchair.  DME owned (not currently used): none.  Upon discharge, patient will have assistance from NH staff.    Objective:     Communicated with nurse Cox prior to session.  Patient found HOB elevated with bed alarm, telemetry, PureWick  upon PT entry to room.    General Precautions: Standard, fall   Orthopedic Precautions:N/A   Braces: N/A    Exams:  · Slurred speech - pt's son reporting she had slurred speech prior but this may seem worse than her baseline  · Pt with R gaze and initially unable to move gaze past midline - by end of session minimally able to gaze across L field of vision but unable to accurately state number of fingers - visual assessment difficult to complete but appears with visual deficit  · Cognitive Exam:  Patient is oriented to Person and hospital once cues provided  · Fine Motor Coordination:    · -       Impaired  L hand with no AROM to complete  · Gross Motor Coordination:  hypertonicity to LUE/LLE  · Postural Exam:  Patient presented with the following abnormalities:    · -       Rounded shoulders  · -       Forward head  · Sensation:    · -       Impaired  light/touch able to sense L upper arm/forearm sensation but no light touch to L hand, senses deep pressure to L hand. Able to localize LLE light touch to L lower leg but not foot or thigh  · Skin Integrity/Edema:      · -       Skin integrity: Visible skin intact  · RLE ROM: WFL  · RLE Strength: ankle DF  WFLs, knee ext 4-/5, hip flexion 4-/5  · LLE ROM: Deficits: no AROM noted  · LLE Strength: Deficits: ankle DF 0/5, knee ext 0/5, hip flexion 0/5 - noted trace hip adduction and extension    Functional Mobility:  · Bed Mobility:     · Scooting: total assistance and to scoot forward while sitting EOB  · Supine to Sit: maximal assistance  · Sit to Supine: maximal assistance  · Transfers:     · Sit to Squat stand: total assistance and of 2 persons with hand-held assist    Therapeutic Activities and Exercises:  Educated pt on role of PT, pt agreeable to participate in therapy session.  Pt transitioned to sit EOB, assisting with RLE and trace movement noted for L hip adduction towards EOB.   Required total A to scoot forward to place feet on the floor.  Pt sat with CGA/min A for static sitting balance.  Attempted more visual scanning and able to cross midline to the left minimally but not fully.  Attempted stand but unable to complete on 2 attempts.  Total A x 2 to scoot right towards HOB then returned to supine.    AM-PAC 6 CLICK MOBILITY  Total Score:8     Patient left HOB elevated with all lines intact, call button in reach, bed alarm on and nurse notified.    GOALS:   Multidisciplinary Problems     Physical Therapy Goals        Problem: Physical Therapy    Goal Priority Disciplines Outcome Goal Variances Interventions   Physical Therapy Goal     PT, PT/OT Ongoing, Progressing     Description: Goals to be met by: 22     Patient will increase functional independence with mobility by performin. Supine <> sit with MInimal Assistance  2. Sit to stand transfer with Moderate Assistance  3. Bed to chair transfer with Moderate Assistance   4. Sitting at edge of bed x 10 minutes with Stand-by Assistance                     History:     History reviewed. No pertinent past medical history.    History reviewed. No pertinent surgical history.    Time Tracking:     PT Received On: 22  PT Start Time: 946     PT Stop  Time: 1010  PT Total Time (min): 24 min cotx with OT    Billable Minutes: Evaluation 10 and Therapeutic Activity 14      04/01/2022

## 2022-04-01 NOTE — PT/OT/SLP EVAL
Occupational Therapy   Evaluation    Name: Renee Sultana  MRN: [id number]  Admitting Diagnosis:  Encephalopathy, metabolic  Recent Surgery: * No surgery found *      Recommendations:     Discharge Recommendations: nursing facility, skilled  Discharge Equipment Recommendations:   (per faciltiy)  Barriers to discharge:  None    Assessment:     Renee Sultana is a 76 y.o. female with a medical diagnosis of Encephalopathy, metabolic.  She presents with The primary encounter diagnosis was Urinary tract infection without hematuria, site unspecified. Diagnoses of Altered mental status, Metabolic encephalopathy, Chest pain, and Weakness were also pertinent to this visit.  . Performance deficits affecting function: weakness, visual deficits, abnormal tone, impaired cognition, impaired endurance, decreased ROM, impaired sensation, impaired self care skills, decreased upper extremity function, decreased coordination, impaired coordination, impaired fine motor, impaired functional mobilty, gait instability, impaired balance, decreased safety awareness, pain, decreased lower extremity function.      Pt would benefit from cont OT services in order to maximize functional independence. Recommending SNF at d/c. Pt currently demonstrates L side neglect, impaired visual field, unable to scan entire L field, L hemiparesis with LUE tone, slurred speech and impaired sensation to L side. Will benefit from daily therapy.    Rehab Prognosis: Good; patient would benefit from acute skilled OT services to address these deficits and reach maximum level of function.       Plan:     Patient to be seen 3 x/week to address the above listed problems via self-care/home management, therapeutic activities, therapeutic exercises  · Plan of Care Expires: 05/01/22  · Plan of Care Reviewed with: patient, son    Subjective     Chief Complaint: abdominal pain; son present reports pt's current functional status is not baseline   Patient/Family Comments/goals:  return to PLOF     Occupational Profile:  Living Environment: resident of NH   Previous level of function: unknown level of assist required for t/fs and bed mobility, but son reports mod I for w/c propulsion; pt uses briefs for toileting, but states able to assist with hygiene and changing; mod I feeding and G&H axs   Equipment Used at Home:  wheelchair  Assistance upon Discharge: from NH staff     Pain/Comfort:  · Pain Rating 1:  (does not rate; reports abdominal pain/discomfort)    Patients cultural, spiritual, Sabianist conflicts given the current situation:      Objective:     Communicated with: aquilino prior to session.    General Precautions: Standard, fall   Orthopedic Precautions:N/A   Braces: N/A    Occupational Performance:    Bed Mobility:    · Patient completed Scooting/Bridging with total assistance and 2 persons  · Patient completed Supine to Sit with maximal assistance  · Patient completed Sit to Supine with maximal assistance    Functional Mobility/Transfers:  · Squat scoots to HOB x 2 trials with total A of 2     Activities of Daily Living:  · Lower Body Dressing: total assistance    · Toileting: total assistance      Cognitive/Visual Perceptual:  Oriented to person and that she is in a hospital but requires cues   Slurred speech   Impaired vision  L neglect- limited/unable to track past midline   Impaired LTM - per chart pt with history of dementia      Physical Exam:  Balance:    -       fair sitting statically; fair - /poor dynamic; poor standing   Postural examination/scapula alignment:    -       Rounded shoulders  -       Forward head  Skin integrity: Visible skin intact  Sensation:  Impaired light touch distally at LUE; intact deep pressure   Dominant hand:    -       right  Upper Extremity Range of Motion:   RUE WFL for pt's needs; pt with tone in LUE, and grimacing with movement at shoulder and elbow; elbow to hand WFL with PROM   Upper Extremity Strength:  RUE grossly 4-/5 throughout; LUE  tone present; 1/5 for shoulder shrug and 0/5 UE   Strength:  WFL R hand; 0/5 L hand   Fine Motor Coordination:  Intact R hand; unable to perform L hand     AMPAC 6 Click ADL:  AMPAC Total Score: 10    Treatment & Education:  Increased attempts and cues made for awareness to L side throughout session   Scanning trials performed while seated and supine; educated son present on impt of getting pt to attend to L side   Sat EOB increased time   Pt initially requiring assist for sitting balance, however, then able to utilize bed rail with RUE to hold self SBA/CGA   Participated in scanning axs while seated EOB   Squat scoots to HOB x 2 trials with increased assist   Returned to supine   Education:    Patient left supine with all lines intact, call button in reach, bed alarm on and nsg and son  present    GOALS:   Multidisciplinary Problems     Occupational Therapy Goals        Problem: Occupational Therapy    Goal Priority Disciplines Outcome Interventions   Occupational Therapy Goal     OT, PT/OT Ongoing, Progressing    Description: Goals to be met by: 5/1/22     Patient will increase functional independence with ADLs by performing:    Feeding with Stand-by Assistance.  UE Dressing with Moderate Assistance.  Grooming while seated with Stand-by Assistance.  Sitting at edge of bed x10 minutes with Modified [place].  Supine to sit with Minimal Assistance.  Squat pivot transfers with Moderate Assistance.  Toilet transfer to bedside commode with Moderate Assistance.  Upper extremity exercise program x10 reps per handout, with assistance as needed.                     History:     History reviewed. No pertinent past medical history.    History reviewed. No pertinent surgical history.    Time Tracking:     OT Date of Treatment: 04/01/22  OT Start Time: 0947  OT Stop Time: 1010  OT Total Time (min): 23 min    Billable Minutes:Evaluation 10 co tx with PT     4/1/2022

## 2022-04-01 NOTE — PT/OT/SLP EVAL
Speech Language Pathology Evaluation  Speech/Language Eval   Bedside Swallow    Patient Name:  Renee Sultana   MRN:  [id number]  Admitting Diagnosis: Acute encephalopathy CVA    Recommendations:                  General Recommendations:  dysarthria, dysphagia and visual spatial deficits   Diet recommendations:  Mechanical soft, Honey Thick   Aspiration Precautions:   1. Pt to be fully awake/alert for all PO  2. HOB at 90* for intake  3. Remain upright at least 30 mins s/p intake- NEEDS Assist for feeding   4. SMALL bites/sips  5. Alternate bites/sips  6. Crush meds and administer in applesauce/pudding  7. Check for pocketing/oral residue on R side of cheek   8. Cue pt to swallow  9. Slow rate of feeding   10. ADD THICKENER TO liquids for HONEY THICK texture  No Jello, ice cream, popsicles, or frozen drinks!  In order to achieve HONEY consistency, mix 1 1/2 packet of thickener with 4oz of liquids.  ALL liquids must be thickened, including broth and soups.     General Precautions: Standard, aspiration, fall (dysarthria)  Communication strategies:  Dysarthria, repeat information, listen carefully, provide basic and concrete information to pt    History per MD     Principal Problem:Encephalopathy, metabolic     Chief Complaint:        Chief Complaint   Patient presents with    Altered Mental Status       Sent from [facility] for evaluation of decreased mental status x1 day. Describes pt. Is slower to responds than normal and not as alert and talkative.          HPI: Renee Sultana is a 76 y.o. female with PMH MDD, dementia without behavioral disturbance, HTN, HLD, T2DM who was sent from nursing home with concern for altered mental status. Per report given to ED provider pt noted to be more lethargic than usual at nursing home. On exam pt only complaint is midline low back pain.      In the ED pt hypertensive, all other VSS. UA with positive leukocytes and nitrites. CBC and CMP unremarkable aside from potassium  "5.4. CT head without acute process. TSH, troponin, BNP and CK wnl. Pt given dose of IV ceftriaxone and [facility] consulted for admission.      History reviewed. No pertinent past medical history.    History reviewed. No pertinent surgical history.    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     The hippocampal component may represent superimposed encephalitis.  Suggest clinical correlation and short-term follow-up.   Extensive motion limited examination.     CT:  No acute intracranial process.  Additional evaluation, as clinically warranted.     Chest X-Rays: Unchanged cardiomediastinal with prominence of the right paratracheal region.  Atherosclerotic calcification of the aortic arch.  Low lung volumes.  No focal consolidation, pleural effusion or pneumothorax.     Prior diet: per dtr Merle, pt was eating soft/chopped solids at facility and thickened liquids but cannot state the level of thickness      Subjective     Consult received for clinical swallow eval/speech/lang eval this date, SLP did communicate with RN prior to eval/treat.    Patient goals: "I cannot see you." Dtr at bedside reports " she has trouble swallowing."    Pain/Comfort:  · Pain Rating 1: 6/10  · Location - Side 1: Right  · Location 1:  (shoulder pain)  · Pain Addressed 1: Nurse notified    Respiratory Status: room air     Objective:     Cognitive Status:    Alert and awake, intermittent confusion  Oriented to self and dtr.  Cannot state time, year or reason for hospital admit       Receptive Language:   Comprehension:   Follows oral motor commands with cues   Responds to yes/no questions with 70% reliability     Pragmatics:    Flat affect and labile at times     Expressive Language:  Verbal:    Fluent " verbal expression impacted by dysarthric speech which is unintelligible at times   Needs careful listening of information from listener        Motor Speech:  Dysarthria of speech marked by oral motor weakness, reduced rate of speech, poor speech clarity in connected speech, monotone pitch, missing dentition, and poor safety awareness into deficits    Voice:   Low volume    Visual-Spatial:  R gaze preference, suspect neglect and visual field cuts     Reading:   DNT     Written Expression:   DNT    Oral Musculature Evaluation  · Oral Musculature: gross asymmetry present, facial asymmetry present, right weakness  · Dentition: edentulous (has dentures that she was using at [facility])  · Secretion Management: problems swallowing secretions  · Mucosal Quality: sticky  · Mandibular Strength and Mobility: impaired  · Oral Labial Strength and Mobility: impaired pursing, impaired retraction, impaired coordination  · Lingual Strength and Mobility: impaired strength  · Velar Elevation:  (slugglish lift is noted)  · Buccal Strength and Mobility: decreased tone  · Volitional Cough: defensive cough is noted  · Volitional Swallow: delayed swallow, multiple swallows were initiated  · Voice Prior to PO Intake: wet gurgly voice is present  · Oral Musculature Comments: R facial weakness is noted upon labial retraction, dtr reports pt has been drooling from R side of face.    Bedside Swallow Eval:   Consistencies Assessed:  · Thin liquids tsp of water, water by cup  · Nectar thick liquids tsp of nectar thick cup sips of nectar thick  · Honey thick liquids tsp of honey, cup swallows of honey  · Puree pudding fed by SLP  · Soft solids diced peach x1     Oral Phase:   · Drooling  · Excess chewing  · Oral residue  · Poor oral acceptance    Pharyngeal Phase:   · coughing/choking  · decreased hyolaryngeal excursion to palpation  · delayed swallow initation  · OVERT clinical signs/symptoms of aspiration  · OVERT clinical signs/symptoms of  pharyngeal dysphagia  · multiple spontaneous swallows  · throat clearing  · wet vocal quality after swallow    Compensatory Strategies  · Feeding assist for all meals, needs assistance for adding thickener to liquids  · Crush oral meds for now  · Check oral cavity and encourage double swallows with liquids and pureed  · Pt voiced during assessment that she does not want pureed diet.     Treatment: ST eval completed including clinical swallow eval. Pt presents with audible dysphagia signs at bedside on thin liquids, nectar and pureed textures. Pt with signficant oral motor weakness and visual spatial deficits likely associated with MRI findings for acute R CVA impacting 3 areas of the brain. Pt with dysarthric speech, R gaze preference and poor attention overall. Pt was living at [facility] for the last 6 years. Pt has 3 adult children. Merle is patients dtr and POA. All aspiration education was provided to dtr during assessment and risks with modified and thickened liquid diet. Pt may initiate MECH soft and HONEY Thick liquids with thickener to be added to liquids. REC: Palliative Med consult to delineate goals of care and discuss disease path trajectory with new onset of CVA which will impact pt's communication, swallowing and level of mobility which has been altered since 1st CVA in 2014. SLP will follow up while on acute unit.           Assessment:     Renee Sultana is a 76 y.o. female admitted for CVA with an SLP diagnosis of Dysphagia, Dysarthria, Cognitive-Linguistic Impairment and Visio-Spatial Impairment.      Goals:   Multidisciplinary Problems     SLP Goals        Problem: SLP    Goal Priority Disciplines Outcome   SLP Goal     SLP Ongoing, Progressing   Description: Short Term Goals:  1. Pt will participate in ongoing swallow assessment to determine least restrictive diet.   2. Pt will tolerate mech soft tray/HONEY thick liquids with no audible s/s of dysphagia and good oral clearance.   3. Pt  will consume % of [facility] soft diet with HONEY  liquids without overt s/s of aspiration given min assist.   4. MBS to be completed when pt is medically appropriate for transfer to radiology to objectively r/o aspiration  5. Pt will state personal orientation to place, time, situation and year with min cues  6. Pt will perform oral motor facial ex program to increase oral motor strength and coordination for speech and swallowing.  7. Pt will perform swallowing compensatory strategies with good ability given mod cues for overall improved swallowing function and safety while swallowing.   8. Pt/family with demonstrate understanding on use of thickener, swallow strategies, and aspiration risks.   9. Given SLP model and instructions, pt will utilize speech strategies to improve connected speech in all contexts with mod cues.   Further goals to be delineated pending pt's overall progress.                        Plan:     · Patient to be seen:  3 x/week   · Plan of Care expires:  04/30/22  · Plan of Care reviewed with:  patient, daughter   · SLP Follow-Up:  Yes       Discharge recommendations:  Discharge Facility/Level of Care Needs: nursing facility, skilled (Palliative has been consulted to discuss goals of care and revisit that pt does not want an alternative source of nutrition or hydration ie peg tube or NGT)   Barriers to Discharge:  None per SLP    Time Tracking:     SLP Treatment Date:   04/01/22  Speech Start Time:  1413  Speech Stop Time:  1445     Speech Total Time (min):  32 min    Billable Minutes: Eval 18  and Eval Swallow and Oral Function 14    04/01/2022

## 2022-04-01 NOTE — PLAN OF CARE
Problem: Adult Inpatient Plan of Care  Goal: Plan of Care Review  Outcome: Ongoing, Progressing     Problem: Skin Injury Risk Increased  Goal: Skin Health and Integrity  Outcome: Ongoing, Progressing     Problem: UTI (Urinary Tract Infection)  Goal: Improved Infection Symptoms  Outcome: Ongoing, Progressing     Problem: Swallowing Impairment (Stroke, Ischemic/Transient Ischemic Attack)  Goal: Optimal Eating and Swallowing without Aspiration  Outcome: Ongoing, Progressing

## 2022-04-01 NOTE — PROCEDURES
"Renee Sultana is a 76 y.o. female patient.    Temp: 98.4 °F (36.9 °C) (04/01/22 1131)  Pulse: 84 (04/01/22 1200)  Resp: 20 (04/01/22 1131)  BP: (!) 165/82 (04/01/22 1131)  SpO2: 98 % (04/01/22 1131)  Weight: 63 kg (139 lb) (04/01/22 0932)  Height: 5' 8" (172.7 cm) (04/01/22 0932)    PICC  Date/Time: 4/1/2022 12:45 PM  Performed by: Nimesh Hare RN  Consent Done: Yes  Time out: Immediately prior to procedure a time out was called to verify the correct patient, procedure, equipment, support staff and site/side marked as required  Indications: med administration  Anesthesia: local infiltration  Local anesthetic: lidocaine 1% without epinephrine  Anesthetic Total (mL): 1  Preparation: skin prepped with ChloraPrep  Skin prep agent dried: skin prep agent completely dried prior to procedure  Sterile barriers: all five maximum sterile barriers used - cap, mask, sterile gown, sterile gloves, and large sterile sheet  Hand hygiene: hand hygiene performed prior to central venous catheter insertion  Location details: right basilic  Catheter type: double lumen  Catheter size: 5 Fr  Catheter Length: 36cm    Ultrasound guidance: yes  Vessel Caliber: medium and large and patent, compressibility normal  Needle advanced into vessel with real time Ultrasound guidance.  Guidewire confirmed in vessel.  Sterile sheath used.  Number of attempts: 1  Post-procedure: blood return through all ports, chlorhexidine patch and sterile dressing applied  Estimated blood loss (mL): 0            Name Nimesh Hare   4/1/2022  "

## 2022-04-01 NOTE — PLAN OF CARE
"DCA to be updated with pt and family. Discussed with facility - Merged with [facility] to review for SNF return. Clinicals sent via [facility].    No future appointments.     amLODIPine  10 mg Oral Daily    aspirin  81 mg Oral Daily    atorvastatin  80 mg Oral QHS    cefTRIAXone (ROCEPHIN) IVPB  1 g Intravenous Q24H    clopidogreL  300 mg Oral Once    clopidogreL  75 mg Oral Daily    enoxaparin  40 mg Subcutaneous Daily    fenofibrate  145 mg Oral Daily    hydrALAZINE  10 mg Intravenous Once    insulin detemir U-100  5 Units Subcutaneous QHS    LIDOcaine  1 patch Transdermal Q24H    lisinopriL  40 mg Oral Daily    mirtazapine  15 mg Oral Nightly    mupirocin   Nasal BID    potassium phosphate IVPB  15 mmol Intravenous Once    sodium chloride 0.9%  10 mL Intravenous Q6H     BP (!) 165/82 (BP Location: Left arm, Patient Position: Lying)   Pulse 84   Temp 98.4 °F (36.9 °C) (Axillary)   Resp 20   Ht 5' 8" (1.727 m)   Wt 63 kg (139 lb)   SpO2 98%   Breastfeeding No   BMI 21.13 kg/m²        04/01/22 1259   Discharge Planning   Assessment Type Discharge Planning Brief Assessment   Resource/Environmental Concerns none   Support Systems Family members   Equipment Currently Used at Home wheelchair   Current Living Arrangements residential facility   Care Facility Name Merged with [facility] - halfway   Patient/Family Anticipated Services at Transition skilled nursing   Discharge Plan A Skilled Nursing Facility     "

## 2022-04-01 NOTE — PROGRESS NOTES
03/31/22 2047   Nurse Notification   Bedside Nurse Notified? Yes   Name of Bedside Nurse Ana Maria   Nurse Notfication Method Secure Chat   Nurse Notified Of Other  (pt c/o back pain 10/10)   Admission   Initial VN Admission Questions Complete   Shift   Virtual Nurse - Patient Verbalized Approval Of Camera Use;VN Rounding   Safety/Activity   Patient Rounds bed in low position;call light in patient/parent reach;clutter free environment maintained;visualized patient;placement of personal items at bedside   Safety Promotion/Fall Prevention assistive device/personal item within reach;bed alarm set;side rails raised x 2   Positioning   Body Position supine   Head of Bed (HOB) Positioning HOB at 30-45 degrees   Pain/Comfort/Sleep   Comfort/Acceptable Pain Level 6   Pain Body Location back   Pain Rating (0-10): Rest 10     VN cued in to pt's room to complete admission questions. No family at bedside. Pt able to state that she lives at [facility]. Pt able to answer some questions. VN attempted to locate nursing home paperwork sent to hospital on floor, ed, or with MD. Unsuccessful. Remainder of admission questions completed via epic chart review. Call bell w/in reach. Instructed to call for needs/assist oob. Bed alarm set.     0552: LILIA pneumonia vaccination status per epic chart review. VN called nursing home to try and obtain info. Nurse on duty at [place] states she doesn't know how to access that information and requested VN to call back after 0715. Will endorse to oncoming day VN

## 2022-04-01 NOTE — PLAN OF CARE
"   04/01/22 1542   Post-Acute Status   Post-Acute Authorization Placement   Post-Acute Placement Status Pending Bed Availability  (TN discussed with facility about potential for pt to return to NH over the weekend. Pt will not be able to return of the weekend. Attending team updated. TN attemepted to update DCA with daughter. No answer. Non emergent VM left.)     No future appointments.    BP (!) 165/82 (BP Location: Left arm, Patient Position: Lying)   Pulse 84   Temp 98.4 °F (36.9 °C) (Axillary)   Resp 20   Ht 5' 8" (1.727 m)   Wt 63 kg (139 lb)   SpO2 98%   Breastfeeding No   BMI 21.13 kg/m²      amLODIPine  10 mg Oral Daily    aspirin  81 mg Oral Daily    atorvastatin  80 mg Oral QHS    cefTRIAXone (ROCEPHIN) IVPB  1 g Intravenous Q24H    clopidogreL  300 mg Oral Once    clopidogreL  75 mg Oral Daily    enoxaparin  40 mg Subcutaneous Daily    fenofibrate  145 mg Oral Daily    hydrALAZINE  10 mg Intravenous Once    insulin detemir U-100  5 Units Subcutaneous QHS    LIDOcaine  1 patch Transdermal Q24H    lisinopriL  40 mg Oral Daily    mirtazapine  15 mg Oral Nightly    mupirocin   Nasal BID    sodium chloride 0.9%  10 mL Intravenous Q6H       "

## 2022-04-01 NOTE — CARE UPDATE
Patient with obvious neurological deficits originally thought to be at baseline. However, spoke to daughter at bedside and apparently these deficits are new. According to daughter these deficits started 3 days ago. Out of the window for TPA, stroke activation not indicated either. MRI revealed and acute stroke. Patient was Plavix loaded. She will have a Neuro consult in the AM and have a full neuro work up. I answered and addressed all of daughter's questions and concerns.    Gopi Wing MD  [facility], PGY-1  2:22 AM, 04/01/2022

## 2022-04-01 NOTE — PLAN OF CARE
Problem: Physical Therapy  Goal: Physical Therapy Goal  Description: Goals to be met by: 22     Patient will increase functional independence with mobility by performin. Supine <> sit with MInimal Assistance  2. Sit to stand transfer with Moderate Assistance  3. Bed to chair transfer with Moderate Assistance   4. Sitting at edge of bed x 10 minutes with Stand-by Assistance    Outcome: Ongoing, Progressing     PT evaluation completed, note to follow. Pt presenting with L sided hemiparesis with UE/LE tone, impaired sensation to L side, L neglect with minimal scanning to L visual field and primarily only gaze to R visual field, and visual deficits that are difficult to assess at this time. Pt would benefit from SNF placement upon d/c.

## 2022-04-02 LAB
ALBUMIN SERPL BCP-MCNC: 3.3 G/DL (ref 3.5–5.2)
ALP SERPL-CCNC: 111 U/L (ref 55–135)
ALT SERPL W/O P-5'-P-CCNC: 13 U/L (ref 10–44)
ANION GAP SERPL CALC-SCNC: 9 MMOL/L (ref 8–16)
AORTIC ROOT ANNULUS: 3.21 CM
AST SERPL-CCNC: 17 U/L (ref 10–40)
AV INDEX (PROSTH): 0.78
AV MEAN GRADIENT: 8 MMHG
AV PEAK GRADIENT: 11 MMHG
AV VALVE AREA: 1.89 CM2
AV VELOCITY RATIO: 0.81
BACTERIA UR CULT: NORMAL
BACTERIA UR CULT: NORMAL
BASOPHILS # BLD AUTO: 0.06 K/UL (ref 0–0.2)
BASOPHILS NFR BLD: 0.8 % (ref 0–1.9)
BILIRUB SERPL-MCNC: 0.7 MG/DL (ref 0.1–1)
BSA FOR ECHO PROCEDURE: 1.74 M2
BUN SERPL-MCNC: 11 MG/DL (ref 8–23)
CALCIUM SERPL-MCNC: 9 MG/DL (ref 8.7–10.5)
CHLORIDE SERPL-SCNC: 106 MMOL/L (ref 95–110)
CO2 SERPL-SCNC: 26 MMOL/L (ref 23–29)
CREAT SERPL-MCNC: 0.9 MG/DL (ref 0.5–1.4)
CV ECHO LV RWT: 0.45 CM
DIFFERENTIAL METHOD: ABNORMAL
DOP CALC AO PEAK VEL: 1.68 M/S
DOP CALC AO VTI: 32.52 CM
DOP CALC LVOT AREA: 2.4 CM2
DOP CALC LVOT DIAMETER: 1.76 CM
DOP CALC LVOT PEAK VEL: 1.36 M/S
DOP CALC LVOT STROKE VOLUME: 61.59 CM3
DOP CALCLVOT PEAK VEL VTI: 25.33 CM
E WAVE DECELERATION TIME: 685.5 MSEC
E/A RATIO: 0.5
E/E' RATIO: 14.2 M/S
ECHO LV POSTERIOR WALL: 0.92 CM (ref 0.6–1.1)
EJECTION FRACTION: 55 %
EOSINOPHIL # BLD AUTO: 0.2 K/UL (ref 0–0.5)
EOSINOPHIL NFR BLD: 2.8 % (ref 0–8)
ERYTHROCYTE [DISTWIDTH] IN BLOOD BY AUTOMATED COUNT: 14.4 % (ref 11.5–14.5)
EST. GFR  (AFRICAN AMERICAN): >60 ML/MIN/1.73 M^2
EST. GFR  (NON AFRICAN AMERICAN): >60 ML/MIN/1.73 M^2
FRACTIONAL SHORTENING: 28 % (ref 28–44)
GLUCOSE SERPL-MCNC: 220 MG/DL (ref 70–110)
HCT VFR BLD AUTO: 39.1 % (ref 37–48.5)
HGB BLD-MCNC: 12.8 G/DL (ref 12–16)
IMM GRANULOCYTES # BLD AUTO: 0.01 K/UL (ref 0–0.04)
IMM GRANULOCYTES NFR BLD AUTO: 0.1 % (ref 0–0.5)
INTERVENTRICULAR SEPTUM: 0.83 CM (ref 0.6–1.1)
IVRT: 137.01 MSEC
LA MAJOR: 3.52 CM
LA MINOR: 4.34 CM
LA WIDTH: 3.43 CM
LEFT ATRIUM SIZE: 3.76 CM
LEFT ATRIUM VOLUME INDEX MOD: 16.6 ML/M2
LEFT ATRIUM VOLUME INDEX: 24.4 ML/M2
LEFT ATRIUM VOLUME MOD: 28.98 CM3
LEFT ATRIUM VOLUME: 42.61 CM3
LEFT INTERNAL DIMENSION IN SYSTOLE: 2.92 CM (ref 2.1–4)
LEFT VENTRICLE DIASTOLIC VOLUME INDEX: 41.45 ML/M2
LEFT VENTRICLE DIASTOLIC VOLUME: 72.54 ML
LEFT VENTRICLE MASS INDEX: 62 G/M2
LEFT VENTRICLE SYSTOLIC VOLUME INDEX: 18.8 ML/M2
LEFT VENTRICLE SYSTOLIC VOLUME: 32.87 ML
LEFT VENTRICULAR INTERNAL DIMENSION IN DIASTOLE: 4.06 CM (ref 3.5–6)
LEFT VENTRICULAR MASS: 108.09 G
LV LATERAL E/E' RATIO: 11.83 M/S
LV SEPTAL E/E' RATIO: 17.75 M/S
LYMPHOCYTES # BLD AUTO: 1.8 K/UL (ref 1–4.8)
LYMPHOCYTES NFR BLD: 24.3 % (ref 18–48)
MAGNESIUM SERPL-MCNC: 1.7 MG/DL (ref 1.6–2.6)
MCH RBC QN AUTO: 26.7 PG (ref 27–31)
MCHC RBC AUTO-ENTMCNC: 32.7 G/DL (ref 32–36)
MCV RBC AUTO: 82 FL (ref 82–98)
MONOCYTES # BLD AUTO: 0.5 K/UL (ref 0.3–1)
MONOCYTES NFR BLD: 7.4 % (ref 4–15)
MV A" WAVE DURATION": 11.8 MSEC
MV PEAK A VEL: 1.42 M/S
MV PEAK E VEL: 0.71 M/S
MV STENOSIS PRESSURE HALF TIME: 198.8 MS
MV VALVE AREA P 1/2 METHOD: 1.11 CM2
NEUTROPHILS # BLD AUTO: 4.6 K/UL (ref 1.8–7.7)
NEUTROPHILS NFR BLD: 64.6 % (ref 38–73)
NRBC BLD-RTO: 0 /100 WBC
PHOSPHATE SERPL-MCNC: 3.9 MG/DL (ref 2.7–4.5)
PLATELET # BLD AUTO: 263 K/UL (ref 150–450)
PMV BLD AUTO: 9.8 FL (ref 9.2–12.9)
POCT GLUCOSE: 203 MG/DL (ref 70–110)
POCT GLUCOSE: 223 MG/DL (ref 70–110)
POCT GLUCOSE: 265 MG/DL (ref 70–110)
POTASSIUM SERPL-SCNC: 3.6 MMOL/L (ref 3.5–5.1)
PROT SERPL-MCNC: 6.6 G/DL (ref 6–8.4)
PULM VEIN S/D RATIO: 1.5
PV PEAK D VEL: 0.22 M/S
PV PEAK S VEL: 0.33 M/S
RA MAJOR: 3.14 CM
RA PRESSURE: 3 MMHG
RA WIDTH: 2.64 CM
RBC # BLD AUTO: 4.79 M/UL (ref 4–5.4)
RIGHT VENTRICULAR END-DIASTOLIC DIMENSION: 2.53 CM
RV TISSUE DOPPLER FREE WALL SYSTOLIC VELOCITY 1 (APICAL 4 CHAMBER VIEW): 7.67 CM/S
SODIUM SERPL-SCNC: 141 MMOL/L (ref 136–145)
TDI LATERAL: 0.06 M/S
TDI SEPTAL: 0.04 M/S
TDI: 0.05 M/S
TRICUSPID ANNULAR PLANE SYSTOLIC EXCURSION: 2.11 CM
WBC # BLD AUTO: 7.19 K/UL (ref 3.9–12.7)

## 2022-04-02 PROCEDURE — 83735 ASSAY OF MAGNESIUM: CPT | Performed by: STUDENT IN AN ORGANIZED HEALTH CARE EDUCATION/TRAINING PROGRAM

## 2022-04-02 PROCEDURE — 84100 ASSAY OF PHOSPHORUS: CPT | Performed by: STUDENT IN AN ORGANIZED HEALTH CARE EDUCATION/TRAINING PROGRAM

## 2022-04-02 PROCEDURE — 97112 NEUROMUSCULAR REEDUCATION: CPT | Mod: CQ

## 2022-04-02 PROCEDURE — 11000001 HC ACUTE MED/SURG PRIVATE ROOM

## 2022-04-02 PROCEDURE — 25000003 PHARM REV CODE 250: Performed by: STUDENT IN AN ORGANIZED HEALTH CARE EDUCATION/TRAINING PROGRAM

## 2022-04-02 PROCEDURE — 63600175 PHARM REV CODE 636 W HCPCS: Performed by: STUDENT IN AN ORGANIZED HEALTH CARE EDUCATION/TRAINING PROGRAM

## 2022-04-02 PROCEDURE — 85025 COMPLETE CBC W/AUTO DIFF WBC: CPT | Performed by: STUDENT IN AN ORGANIZED HEALTH CARE EDUCATION/TRAINING PROGRAM

## 2022-04-02 PROCEDURE — 80053 COMPREHEN METABOLIC PANEL: CPT | Performed by: STUDENT IN AN ORGANIZED HEALTH CARE EDUCATION/TRAINING PROGRAM

## 2022-04-02 PROCEDURE — 97530 THERAPEUTIC ACTIVITIES: CPT | Mod: CQ

## 2022-04-02 PROCEDURE — A4216 STERILE WATER/SALINE, 10 ML: HCPCS | Performed by: STUDENT IN AN ORGANIZED HEALTH CARE EDUCATION/TRAINING PROGRAM

## 2022-04-02 RX ORDER — CYANOCOBALAMIN 1000 UG/ML
1000 INJECTION, SOLUTION INTRAMUSCULAR; SUBCUTANEOUS ONCE
Status: COMPLETED | OUTPATIENT
Start: 2022-04-02 | End: 2022-04-02

## 2022-04-02 RX ORDER — MAGNESIUM SULFATE HEPTAHYDRATE 40 MG/ML
2 INJECTION, SOLUTION INTRAVENOUS ONCE
Status: COMPLETED | OUTPATIENT
Start: 2022-04-02 | End: 2022-04-02

## 2022-04-02 RX ORDER — POTASSIUM CHLORIDE 20 MEQ/1
40 TABLET, EXTENDED RELEASE ORAL ONCE
Status: COMPLETED | OUTPATIENT
Start: 2022-04-02 | End: 2022-04-02

## 2022-04-02 RX ADMIN — SODIUM CHLORIDE, PRESERVATIVE FREE 10 ML: 5 INJECTION INTRAVENOUS at 06:04

## 2022-04-02 RX ADMIN — MUPIROCIN: 20 OINTMENT TOPICAL at 08:04

## 2022-04-02 RX ADMIN — ATORVASTATIN CALCIUM 80 MG: 40 TABLET, FILM COATED ORAL at 08:04

## 2022-04-02 RX ADMIN — CEFTRIAXONE 1 G: 1 INJECTION, SOLUTION INTRAVENOUS at 10:04

## 2022-04-02 RX ADMIN — CLOPIDOGREL 75 MG: 75 TABLET, FILM COATED ORAL at 09:04

## 2022-04-02 RX ADMIN — MIRTAZAPINE 15 MG: 7.5 TABLET ORAL at 08:04

## 2022-04-02 RX ADMIN — ENOXAPARIN SODIUM 40 MG: 100 INJECTION SUBCUTANEOUS at 05:04

## 2022-04-02 RX ADMIN — CYANOCOBALAMIN 1000 MCG: 1000 INJECTION, SOLUTION INTRAMUSCULAR at 09:04

## 2022-04-02 RX ADMIN — POTASSIUM CHLORIDE 40 MEQ: 20 TABLET, EXTENDED RELEASE ORAL at 06:04

## 2022-04-02 RX ADMIN — KETOROLAC TROMETHAMINE 15 MG: 30 INJECTION, SOLUTION INTRAMUSCULAR; INTRAVENOUS at 08:04

## 2022-04-02 RX ADMIN — SODIUM CHLORIDE, PRESERVATIVE FREE 10 ML: 5 INJECTION INTRAVENOUS at 11:04

## 2022-04-02 RX ADMIN — LISINOPRIL 40 MG: 20 TABLET ORAL at 09:04

## 2022-04-02 RX ADMIN — AMLODIPINE BESYLATE 10 MG: 5 TABLET ORAL at 09:04

## 2022-04-02 RX ADMIN — ASPIRIN 81 MG CHEWABLE TABLET 81 MG: 81 TABLET CHEWABLE at 09:04

## 2022-04-02 RX ADMIN — MUPIROCIN: 20 OINTMENT TOPICAL at 09:04

## 2022-04-02 RX ADMIN — SODIUM CHLORIDE, PRESERVATIVE FREE 10 ML: 5 INJECTION INTRAVENOUS at 12:04

## 2022-04-02 RX ADMIN — LIDOCAINE 1 PATCH: 50 PATCH CUTANEOUS at 05:04

## 2022-04-02 RX ADMIN — MAGNESIUM SULFATE IN WATER 2 G: 40 INJECTION, SOLUTION INTRAVENOUS at 06:04

## 2022-04-02 NOTE — PLAN OF CARE
Plan of care reviewed with patient. Patient voiced understanding. NSR on monitor. No acute distress noted. Side rails x 3, bed in lowest position, call bell within reach, pt advised to call for assistance. Maintain bed alarm for patient safety.

## 2022-04-02 NOTE — PROGRESS NOTES
[facility] Medicine  Progress Note    Patient Name: Renee Sultana  MRN: [id number]  Patient Class: IP- Inpatient   Admission Date: 3/31/2022  Length of Stay: 1 days  Attending Physician: Sujey Novak MD  Primary Care Provider: Jarred Miramontes MD        Subjective:     Principal Problem:Acute encephalopathy        HPI:  Renee Sultana is a 76 y.o. female with PMH MDD, dementia without behavioral disturbance, HTN, HLD, T2DM who was sent from nursing home with concern for altered mental status. Per report given to ED provider pt noted to be more lethargic than usual at nursing home. On exam pt only complaint is midline low back pain.     In the ED pt hypertensive, all other VSS. UA with positive leukocytes and nitrites. CBC and CMP unremarkable aside from potassium 5.4. CT head without acute process. TSH, troponin, BNP and CK wnl. Pt given dose of IV ceftriaxone and [facility] consulted for admission.       Overview/Hospital Course:  No notes on file    Interval History:  NAEON, pt remains AOE x3, Pt is tolerating pudding pleasure feeds. Pain is well controlled at this time. Pt is now exhibiting L sided rigidity. Patient failed SLP swallow study and refused pureed diet. Per patients wishes, Pt does not want NG tube for nutrition. Family understands that pt is unlikely to have meaningful return of function to her left side and dysphagia and are electing to initiate hospice care.     Review of Systems   Constitutional:  Negative for chills, fever and unexpected weight change.   HENT:  Positive for trouble swallowing. Negative for congestion, rhinorrhea, sneezing and sore throat.    Eyes:  Negative for redness and visual disturbance.   Respiratory:  Negative for cough, shortness of breath and wheezing.    Cardiovascular:  Negative for chest pain and leg swelling.   Gastrointestinal:  Negative for abdominal pain, blood in stool, constipation and diarrhea.   Genitourinary:  Negative for dysuria  and hematuria.   Musculoskeletal:  Negative for arthralgias and joint swelling.   Skin:  Negative for color change and pallor.   Neurological:  Positive for facial asymmetry, speech difficulty and weakness. Negative for light-headedness and headaches.   Psychiatric/Behavioral:  Negative for agitation and confusion.    Objective:     Vital Signs (Most Recent):  Temp: (!) 91.3 °F (32.9 °C) (04/02/22 0735)  Pulse: 73 (04/02/22 0735)  Resp: 18 (04/02/22 0735)  BP: (!) 146/69 (04/02/22 0735)  SpO2: 95 % (04/02/22 0735)   Vital Signs (24h Range):  Temp:  [91.3 °F (32.9 °C)-98.4 °F (36.9 °C)] 91.3 °F (32.9 °C)  Pulse:  [70-98] 73  Resp:  [18-20] 18  SpO2:  [94 %-98 %] 95 %  BP: (141-165)/(67-84) 146/69     Weight: 63 kg (139 lb)  Body mass index is 21.13 kg/m².    Intake/Output Summary (Last 24 hours) at 4/2/2022 0805  Last data filed at 4/2/2022 0400  Gross per 24 hour   Intake --   Output 300 ml   Net -300 ml        Physical Exam  Vitals and nursing note reviewed.   Constitutional:       Appearance: Normal appearance.   HENT:      Head: Normocephalic and atraumatic.      Right Ear: Tympanic membrane normal.      Left Ear: Tympanic membrane normal.      Mouth/Throat:      Mouth: Mucous membranes are moist.      Pharynx: Oropharynx is clear.   Eyes:      General: Visual field deficit present.      Extraocular Movements: Extraocular movements intact.      Pupils: Pupils are equal, round, and reactive to light.   Cardiovascular:      Rate and Rhythm: Normal rate and regular rhythm.      Pulses: Normal pulses.      Heart sounds: Normal heart sounds.   Pulmonary:      Effort: Pulmonary effort is normal.      Breath sounds: Normal breath sounds.   Abdominal:      General: Abdomen is flat.      Palpations: Abdomen is soft.   Musculoskeletal:         General: Normal range of motion.      Cervical back: Normal range of motion.      Comments: L sided rigidity   Skin:     General: Skin is warm.   Neurological:      General: No focal  deficit present.      Mental Status: She is alert and oriented to person, place, and time.      GCS: GCS eye subscore is 4. GCS verbal subscore is 5. GCS motor subscore is 6.      Cranial Nerves: Cranial nerve deficit and facial asymmetry present.      Sensory: Sensory deficit present.      Motor: Weakness present.      Coordination: Coordination abnormal. Finger-Nose-Finger Test abnormal.      Deep Tendon Reflexes: Babinski sign absent on the right side. Babinski sign absent on the left side.      Reflex Scores:       Tricep reflexes are 4+ on the left side.       Bicep reflexes are 3+ on the right side and 4+ on the left side.       Brachioradialis reflexes are 3+ on the right side and 4+ on the left side.       Patellar reflexes are 3+ on the right side and 4+ on the left side.       Achilles reflexes are 3+ on the right side and 4+ on the left side.     Comments: Complete L sided hemiparesis  Normal sensation to face and R extremities.   No sensation to light touch in L extremities, Sensation to heavy squeeze in LLE  Could not assess pronator drift 2/2 weakness  Gait defferred   Psychiatric:         Mood and Affect: Mood normal.       Significant Labs: All pertinent labs within the past 24 hours have been reviewed.  A1C:   Recent Labs   Lab 04/01/22  0254   HGBA1C 8.7*       CBC:   Recent Labs   Lab 03/31/22  1417 04/01/22  0254 04/02/22  0426   WBC 8.82 9.34 7.19   HGB 15.1 13.0 12.8   HCT 46.3 40.3 39.1    289 263       CMP:   Recent Labs   Lab 03/31/22  1417 04/01/22  0254 04/02/22  0426    142 141   K 5.4* 3.2* 3.6    108 106   CO2 24 23 26   * 247* 220*   BUN 15 12 11   CREATININE 1.1 1.0 0.9   CALCIUM 9.7 9.0 9.0   PROT 8.0 6.8 6.6   ALBUMIN 3.8 3.4* 3.3*   BILITOT 0.6 0.7 0.7   ALKPHOS 138* 117 111   AST 30 17 17   ALT 18 11 13   ANIONGAP 12 11 9   EGFRNONAA 50* 56* >60         Significant Imaging: I have reviewed all pertinent imaging results/findings within the past 24  hours.  MRI    Imaging Results              MRI Brain Without Contrast (Final result)  Result time 03/31/22 23:17:23      Final result by Pa Espitia MD (03/31/22 23:17:23)                   Impression:      Acute infarction in the right corona radiata with extension into the right basal ganglia and also involvement of the right hippocampus.    The hippocampal component may represent superimposed encephalitis.  Suggest clinical correlation and short-term follow-up.    Extensive motion limited examination.    Case discussed with Dr. Wing on 03/31/2022 at 23:16.      Electronically signed by: Pa Espitia MD  Date:    03/31/2022  Time:    23:17               Narrative:    EXAMINATION:  MRI BRAIN WITHOUT CONTRAST    CLINICAL HISTORY:  Mental status change, unknown cause;    TECHNIQUE:  Multiplanar multisequence MR imaging of the brain was performed without contrast.  There are significant motion limitations to the examination.    COMPARISON:  CT head dated 03/31/2022.    FINDINGS:  The craniocervical junction is intact.  There is diffusion restriction along the posterior aspect of the right corona radiata with extension to the right basal ganglia.  There is some component of involvement of the right hippocampus.    The remainder of the examination is significantly degraded secondary to motion.  There are T2/FLAIR signal hyperintense within the periventricular and subcortical white matter.  No definitive extra-axial fluid collection is identified.  There is no evidence of significant mass effect.                                       CT Head Without Contrast (Final result)  Result time 03/31/22 15:47:36      Final result by Pa Espitia MD (03/31/22 15:47:36)                   Impression:      No acute intracranial process.  Additional evaluation, as clinically warranted.    Changes of extensive chronic small vessel ischemic disease and cerebral volume loss.      Electronically signed by: Pa Espitia  MD  Date:    03/31/2022  Time:    15:47               Narrative:    EXAMINATION:  CT HEAD WITHOUT CONTRAST    CLINICAL HISTORY:  Mental status change, unknown cause;    TECHNIQUE:  Low dose axial images were obtained through the head.  Coronal and sagittal reformations were also performed. Contrast was not administered.    COMPARISON:  None.    FINDINGS:  The subcutaneous tissues are unremarkable.  The bony calvarium is intact.  The paranasal sinuses are within normal limits.  The mastoid air cells are clear.  The orbits and intraorbital contents are within normal limits.    The craniocervical junction is intact.  There are no extra-axial fluid collections.  There is no evidence of intracranial hemorrhage.  The ventricles and sulci are prominent suggestive of cerebral volume loss.  There are extensive hypodensities within the periventricular and subcortical white matter.  The gray-white differentiation is maintained.  There are extensive calcifications of the skull base vessels.  There is no dense vessel sign.  There is no evidence of mass effect.                                       X-Ray Chest 1 View (Final result)  Result time 03/31/22 15:06:20      Final result by Ankit Vazquez MD (03/31/22 15:06:20)                   Impression:      1. Mediastinal configuration is likely related to patient positioning however consider PA and lateral for further evaluation.  No large focal consolidation.      Electronically signed by: Ankit Vazquez MD  Date:    03/31/2022  Time:    15:06               Narrative:    EXAMINATION:  XR CHEST 1 VIEW    CLINICAL HISTORY:  Altered mental status;    TECHNIQUE:  Single frontal view of the chest was performed.    COMPARISON:  None    FINDINGS:  The cardiomediastinal silhouette is prominent noting magnification by technique.  Patient is rotated.  There is prominence of the right paratracheal region, may be on the basis of rotation.  There is atherosclerotic calcification of the  aorta..  There is no pleural effusion.  The trachea is midline.  The lungs are symmetrically expanded bilaterally with minimally coarse interstitial attenuation.  No large focal consolidation seen.  There is no pneumothorax.  The osseous structures are remarkable for degenerative changes..                                          Assessment/Plan:      * Acute encephalopathy  Sent from nursing home 2/2 being more lethargic than baseline  No hallucinations or confusion noted  CT head shows changes of extensive chronic small vessel ischemic disease and cerebral volume loss.UA with leukocytes and nitrites, encephalopathy initially thought to be 2/2 UTI  TSH wnl  MRI shows large acute infarction in the right corona radiata with extension into the right basal ganglia and also involvement of the right hippocampus.  The hippocampal component may represent superimposed encephalitis  Left Hemiparesis is decidedly not baseline per family who states noticed change between Tuesday and wedenesday    PLAN:  - CTA head and neck  - Consult Neurology appreciate recs  - Replete B12  - Delirium precautions  - Awaiting SLP eval for swallow study  - Will also treat UTI      Allergic rhinitis  Hold home claritin 10mg as could be contributing to AMS      Mixed hyperlipidemia  Continue home lipitor and fenofibrate       Type 2 diabetes mellitus, without long-term current use of insulin  Home regimen: Metformin 1g nightly and glipizide 5mg daily    PLAN:  - Hold home medications  - Detemir 7u qhs  - MDSSI with goal glucose 140-180      Dementia without behavioral disturbance  - Unknown baseline, A&O to self and place only on admission  - Delirium precautions, avoid beers criteria medications  - Continue baseline mechanical soft diet   - Pt is DNR per [place] sent from nursing home. Comfort measures only except for antibiotics if antibiotics can prolong life.       Mild episode of recurrent major depressive disorder  - Continue home remeron  15mg nightly  - Hold home trazodone 12.5mg as dose unavailable in the hospital and could be contributing to AMS  - Hold home ativan 0.5mg BID PRN, will monitor for signs of benzo withdrawal with q4hr CIWA      Primary hypertension  - Continue home amlodipine 10mg, lisinopril 40mg and ASA 81mg  - Hold home clonidine patch as dose not written on nursing home orders      Acute cystitis with hematuria  UA with leukocytes and nitrites on admission  Noted to have back pain on admission  S/p 1g rocephin in the ED    PLAN:  - Appears fluid down, will give 500cc LR bolus  - Continue Rocephin 1g qday 3/5 days  - UCx pending  - Lidocaine patch PRN for back pain      VTE Risk Mitigation (From admission, onward)         Ordered     enoxaparin injection 40 mg  Daily         03/31/22 1549     IP VTE HIGH RISK PATIENT  Once         03/31/22 1549     Place sequential compression device  Until discontinued         03/31/22 1549                Discharge Planning   JOSEF:      Code Status: DNR   Is the patient medically ready for discharge?:     Reason for patient still in hospital (select all that apply): Treatment and Pending disposition  Discharge Plan A: Skilled Nursing Facility        Hi Estrada MD  Department of Hospital Medicine   [facility]

## 2022-04-02 NOTE — SUBJECTIVE & OBJECTIVE
Interval History:  STEVENEON, pt remains AOE x3, Pt is tolerating pudding pleasure feeds. Pain is well controlled at this time. Pt is now exhibiting L sided rigidity. Patient failed SLP swallow study and refused pureed diet. Per patients wishes, Pt does not want NG tube for nutrition. Family understands that pt is unlikely to have meaningful return of function to her left side and dysphagia and are electing to initiate hospice care.     Review of Systems   Constitutional:  Negative for chills, fever and unexpected weight change.   HENT:  Positive for trouble swallowing. Negative for congestion, rhinorrhea, sneezing and sore throat.    Eyes:  Negative for redness and visual disturbance.   Respiratory:  Negative for cough, shortness of breath and wheezing.    Cardiovascular:  Negative for chest pain and leg swelling.   Gastrointestinal:  Negative for abdominal pain, blood in stool, constipation and diarrhea.   Genitourinary:  Negative for dysuria and hematuria.   Musculoskeletal:  Negative for arthralgias and joint swelling.   Skin:  Negative for color change and pallor.   Neurological:  Positive for facial asymmetry, speech difficulty and weakness. Negative for light-headedness and headaches.   Psychiatric/Behavioral:  Negative for agitation and confusion.    Objective:     Vital Signs (Most Recent):  Temp: (!) 91.3 °F (32.9 °C) (04/02/22 0735)  Pulse: 73 (04/02/22 0735)  Resp: 18 (04/02/22 0735)  BP: (!) 146/69 (04/02/22 0735)  SpO2: 95 % (04/02/22 0735)   Vital Signs (24h Range):  Temp:  [91.3 °F (32.9 °C)-98.4 °F (36.9 °C)] 91.3 °F (32.9 °C)  Pulse:  [70-98] 73  Resp:  [18-20] 18  SpO2:  [94 %-98 %] 95 %  BP: (141-165)/(67-84) 146/69     Weight: 63 kg (139 lb)  Body mass index is 21.13 kg/m².    Intake/Output Summary (Last 24 hours) at 4/2/2022 0805  Last data filed at 4/2/2022 0400  Gross per 24 hour   Intake --   Output 300 ml   Net -300 ml        Physical Exam  Vitals and nursing note reviewed.   Constitutional:        Appearance: Normal appearance.   HENT:      Head: Normocephalic and atraumatic.      Right Ear: Tympanic membrane normal.      Left Ear: Tympanic membrane normal.      Mouth/Throat:      Mouth: Mucous membranes are moist.      Pharynx: Oropharynx is clear.   Eyes:      General: Visual field deficit present.      Extraocular Movements: Extraocular movements intact.      Pupils: Pupils are equal, round, and reactive to light.   Cardiovascular:      Rate and Rhythm: Normal rate and regular rhythm.      Pulses: Normal pulses.      Heart sounds: Normal heart sounds.   Pulmonary:      Effort: Pulmonary effort is normal.      Breath sounds: Normal breath sounds.   Abdominal:      General: Abdomen is flat.      Palpations: Abdomen is soft.   Musculoskeletal:         General: Normal range of motion.      Cervical back: Normal range of motion.      Comments: L sided rigidity   Skin:     General: Skin is warm.   Neurological:      General: No focal deficit present.      Mental Status: She is alert and oriented to person, place, and time.      GCS: GCS eye subscore is 4. GCS verbal subscore is 5. GCS motor subscore is 6.      Cranial Nerves: Cranial nerve deficit and facial asymmetry present.      Sensory: Sensory deficit present.      Motor: Weakness present.      Coordination: Coordination abnormal. Finger-Nose-Finger Test abnormal.      Deep Tendon Reflexes: Babinski sign absent on the right side. Babinski sign absent on the left side.      Reflex Scores:       Tricep reflexes are 4+ on the left side.       Bicep reflexes are 3+ on the right side and 4+ on the left side.       Brachioradialis reflexes are 3+ on the right side and 4+ on the left side.       Patellar reflexes are 3+ on the right side and 4+ on the left side.       Achilles reflexes are 3+ on the right side and 4+ on the left side.     Comments: Complete L sided hemiparesis  Normal sensation to face and R extremities.   No sensation to light touch in L  extremities, Sensation to heavy squeeze in LLE  Could not assess pronator drift 2/2 weakness  Gait defferred   Psychiatric:         Mood and Affect: Mood normal.       Significant Labs: All pertinent labs within the past 24 hours have been reviewed.  A1C:   Recent Labs   Lab 04/01/22  0254   HGBA1C 8.7*       CBC:   Recent Labs   Lab 03/31/22  1417 04/01/22  0254 04/02/22  0426   WBC 8.82 9.34 7.19   HGB 15.1 13.0 12.8   HCT 46.3 40.3 39.1    289 263       CMP:   Recent Labs   Lab 03/31/22  1417 04/01/22  0254 04/02/22  0426    142 141   K 5.4* 3.2* 3.6    108 106   CO2 24 23 26   * 247* 220*   BUN 15 12 11   CREATININE 1.1 1.0 0.9   CALCIUM 9.7 9.0 9.0   PROT 8.0 6.8 6.6   ALBUMIN 3.8 3.4* 3.3*   BILITOT 0.6 0.7 0.7   ALKPHOS 138* 117 111   AST 30 17 17   ALT 18 11 13   ANIONGAP 12 11 9   EGFRNONAA 50* 56* >60         Significant Imaging: I have reviewed all pertinent imaging results/findings within the past 24 hours.  MRI    Imaging Results              MRI Brain Without Contrast (Final result)  Result time 03/31/22 23:17:23      Final result by Pa Espitia MD (03/31/22 23:17:23)                   Impression:      Acute infarction in the right corona radiata with extension into the right basal ganglia and also involvement of the right hippocampus.    The hippocampal component may represent superimposed encephalitis.  Suggest clinical correlation and short-term follow-up.    Extensive motion limited examination.    Case discussed with Dr. Wing on 03/31/2022 at 23:16.      Electronically signed by: Pa Espitia MD  Date:    03/31/2022  Time:    23:17               Narrative:    EXAMINATION:  MRI BRAIN WITHOUT CONTRAST    CLINICAL HISTORY:  Mental status change, unknown cause;    TECHNIQUE:  Multiplanar multisequence MR imaging of the brain was performed without contrast.  There are significant motion limitations to the examination.    COMPARISON:  CT head dated  03/31/2022.    FINDINGS:  The craniocervical junction is intact.  There is diffusion restriction along the posterior aspect of the right corona radiata with extension to the right basal ganglia.  There is some component of involvement of the right hippocampus.    The remainder of the examination is significantly degraded secondary to motion.  There are T2/FLAIR signal hyperintense within the periventricular and subcortical white matter.  No definitive extra-axial fluid collection is identified.  There is no evidence of significant mass effect.                                       CT Head Without Contrast (Final result)  Result time 03/31/22 15:47:36      Final result by Pa Espitia MD (03/31/22 15:47:36)                   Impression:      No acute intracranial process.  Additional evaluation, as clinically warranted.    Changes of extensive chronic small vessel ischemic disease and cerebral volume loss.      Electronically signed by: Pa Espitia MD  Date:    03/31/2022  Time:    15:47               Narrative:    EXAMINATION:  CT HEAD WITHOUT CONTRAST    CLINICAL HISTORY:  Mental status change, unknown cause;    TECHNIQUE:  Low dose axial images were obtained through the head.  Coronal and sagittal reformations were also performed. Contrast was not administered.    COMPARISON:  None.    FINDINGS:  The subcutaneous tissues are unremarkable.  The bony calvarium is intact.  The paranasal sinuses are within normal limits.  The mastoid air cells are clear.  The orbits and intraorbital contents are within normal limits.    The craniocervical junction is intact.  There are no extra-axial fluid collections.  There is no evidence of intracranial hemorrhage.  The ventricles and sulci are prominent suggestive of cerebral volume loss.  There are extensive hypodensities within the periventricular and subcortical white matter.  The gray-white differentiation is maintained.  There are extensive calcifications of the skull base  vessels.  There is no dense vessel sign.  There is no evidence of mass effect.                                       X-Ray Chest 1 View (Final result)  Result time 03/31/22 15:06:20      Final result by Ankit Vazquez MD (03/31/22 15:06:20)                   Impression:      1. Mediastinal configuration is likely related to patient positioning however consider PA and lateral for further evaluation.  No large focal consolidation.      Electronically signed by: Ankit Vazquez MD  Date:    03/31/2022  Time:    15:06               Narrative:    EXAMINATION:  XR CHEST 1 VIEW    CLINICAL HISTORY:  Altered mental status;    TECHNIQUE:  Single frontal view of the chest was performed.    COMPARISON:  None    FINDINGS:  The cardiomediastinal silhouette is prominent noting magnification by technique.  Patient is rotated.  There is prominence of the right paratracheal region, may be on the basis of rotation.  There is atherosclerotic calcification of the aorta..  There is no pleural effusion.  The trachea is midline.  The lungs are symmetrically expanded bilaterally with minimally coarse interstitial attenuation.  No large focal consolidation seen.  There is no pneumothorax.  The osseous structures are remarkable for degenerative changes..

## 2022-04-02 NOTE — ASSESSMENT & PLAN NOTE
Sent from nursing home 2/2 being more lethargic than baseline  No hallucinations or confusion noted  CT head shows changes of extensive chronic small vessel ischemic disease and cerebral volume loss.UA with leukocytes and nitrites, encephalopathy initially thought to be 2/2 UTI  TSH wnl  MRI shows large acute infarction in the right corona radiata with extension into the right basal ganglia and also involvement of the right hippocampus.  The hippocampal component may represent superimposed encephalitis  Left Hemiparesis is decidedly not baseline per family who states noticed change between Tuesday and wedenesday    PLAN:  - CTA head and neck  - Consult Neurology appreciate recs  - Replete B12  - Delirium precautions  - Awaiting SLP eval for swallow study  - Will also treat UTI

## 2022-04-02 NOTE — ASSESSMENT & PLAN NOTE
Home regimen: Metformin 1g nightly and glipizide 5mg daily    PLAN:  - Hold home medications  - Detemir 7u qhs  - MDSSI with goal glucose 140-180

## 2022-04-02 NOTE — PLAN OF CARE
Problem: Physical Therapy  Goal: Physical Therapy Goal  Description: Goals to be met by: 22     Patient will increase functional independence with mobility by performin. Supine <> sit with MInimal Assistance  2. Sit to stand transfer with Moderate Assistance  3. Bed to chair transfer with Moderate Assistance   4. Sitting at edge of bed x 10 minutes with Stand-by Assistance    Outcome: Ongoing, Progressing   Pt continues to work toward all goals. Able to sit at the EOB for ~20-23 minutes with CGA initially then progressing to CGA/SBA. Pt using RUE to hold self in position. Sits with increase trunk flexion. Able to work on turning head/cervical spine to the left by 8 repetitions. Attempt to perform sit to stand unsuccessful.

## 2022-04-02 NOTE — PT/OT/SLP PROGRESS
Physical Therapy Treatment    Patient Name:  Renee Sultana   MRN:  [id number]    Recommendations:     Discharge Recommendations:  nursing facility, skilled   Discharge Equipment Recommendations:  (Defer to NH)   Barriers to discharge: None    Assessment:     Renee Sultana is a 76 y.o. female admitted with a medical diagnosis of Acute encephalopathy.  She presents with the following impairments/functional limitations:  weakness, impaired endurance, impaired self care skills, impaired functional mobilty, gait instability, impaired balance, impaired cognition, decreased coordination, decreased upper extremity function, decreased lower extremity function, decreased safety awareness, pain, abnormal tone, decreased ROM, impaired coordination, impaired fine motor. Pt able to sit at EOB ~20-23 min with CGA initially then progressing to CGA/SBA. Pt using RUE to hold self into position. Worked on head/cervical spine rotation to the left by 8 repetitions looking at PTA positioned off midline to the left side of pt. Recommending SNF upon discharge. Per chart pt's family transitioning pt to palliative care.    Rehab Prognosis: Fair; patient would benefit from acute skilled PT services to address these deficits and reach maximum level of function.    Recent Surgery: * No surgery found *      Plan:     During this hospitalization, patient to be seen 3 x/week to address the identified rehab impairments via therapeutic activities, therapeutic exercises, neuromuscular re-education, wheelchair management/training and progress toward the following goals:    · Plan of Care Expires:  05/01/22    Subjective     Chief Complaint: None Expressed  Patient/Family Comments/goals: Pt states her back hurts with certain movements performed.  Pain/Comfort:  · Pain Rating 1:  (Nor rated)  · Location - Side 1: Bilateral  · Location - Orientation 1: generalized  · Location 1: back (While performing movement)  · Pain Addressed 1: Reposition,  Distraction, Cessation of Activity  · Pain Rating Post-Intervention 1:  (Not rated)      Objective:     Communicated with nurse prior to session.  Patient found supine with bed alarm, PureWick, peripheral IV upon PT entry to room.     General Precautions: Standard, aspiration, fall (Dysarthria)   Orthopedic Precautions:N/A   Braces: N/A  Respiratory Status: Room air     Functional Mobility:  · Bed Mobility:     · Rolling Left:  moderate assistance, maximal assistance and pt Pt initiates movement and able to put right hand onto bed rail with assistance from PTA  · Rolling Right: total assistance and dependence  · Scooting: total assistance and  To EOB and HOB  · Supine to Sit: maximal assistance  · Sit to Supine: maximal assistance      AM-PAC 6 CLICK MOBILITY  Turning over in bed (including adjusting bedclothes, sheets and blankets)?: 2  Sitting down on and standing up from a chair with arms (e.g., wheelchair, bedside commode, etc.): 1  Moving from lying on back to sitting on the side of the bed?: 2  Moving to and from a bed to a chair (including a wheelchair)?: 1  Need to walk in hospital room?: 1  Climbing 3-5 steps with a railing?: 1  Basic Mobility Total Score: 8       Therapeutic Activities and Exercises:   Pt performed supine to sit moving to left side requiring max A. Able to sit at EOB ~20-23 minutes initially with CGA then progressing to CGA/SBA with pt using RUE to hold self into position. LUE requires max/total A to place onto bed requiring max A to hold into position. Sits with an increased trunk flexion and when attempts to decrease pt states it hurts her back. Pt received PROM (LAQ's) to LLE 1 x 5 reps, AROM/AAROM to RLE 1 x 5 reps. Received gentle manual stretching to bilateral hamstring 1 x 5 reps each. Pt able to work on left head/cervical rotation 1 x 8 reps. Verbal cueing to look toward left side with PTA positioned off midline to pt's left side. Pt still favoring head/cervical rotation to the  right but will turn head/cervical spine and gaze to the left with increased verbal/tactile cueing. Returned supine and rolled left to right by 2 reps to adjust bed linens and pad with pt requiring assistance as described above. Set up to eat apple sauce and breakfast.    Patient left HOB elevated with all lines intact, call button in reach, bed alarm on and  nurse notified..    GOALS:   Multidisciplinary Problems     Physical Therapy Goals        Problem: Physical Therapy    Goal Priority Disciplines Outcome Goal Variances Interventions   Physical Therapy Goal     PT, PT/OT Ongoing, Progressing     Description: Goals to be met by: 22     Patient will increase functional independence with mobility by performin. Supine <> sit with MInimal Assistance  2. Sit to stand transfer with Moderate Assistance  3. Bed to chair transfer with Moderate Assistance   4. Sitting at edge of bed x 10 minutes with Stand-by Assistance                     Time Tracking:     PT Received On: 22  PT Start Time: 0751     PT Stop Time: 0845  PT Total Time (min): 54 min     Billable Minutes: Therapeutic Activity   30 and Neuromuscular Re-education  24    Treatment Type: Treatment  PT/PTA: PTA     PTA Visit Number: 1     2022

## 2022-04-02 NOTE — ASSESSMENT & PLAN NOTE
UA with leukocytes and nitrites on admission  Noted to have back pain on admission  S/p 1g rocephin in the ED    PLAN:  - Appears fluid down, will give 500cc LR bolus  - Continue Rocephin 1g qday 3/5 days  - UCx pending  - Lidocaine patch PRN for back pain

## 2022-04-03 PROBLEM — N30.01 ACUTE CYSTITIS WITH HEMATURIA: Status: RESOLVED | Noted: 2022-03-31 | Resolved: 2022-04-03

## 2022-04-03 PROBLEM — I63.9 CVA (CEREBRAL VASCULAR ACCIDENT): Status: ACTIVE | Noted: 2022-03-31

## 2022-04-03 LAB — POCT GLUCOSE: 221 MG/DL (ref 70–110)

## 2022-04-03 PROCEDURE — 25000003 PHARM REV CODE 250: Performed by: STUDENT IN AN ORGANIZED HEALTH CARE EDUCATION/TRAINING PROGRAM

## 2022-04-03 PROCEDURE — 11000001 HC ACUTE MED/SURG PRIVATE ROOM

## 2022-04-03 PROCEDURE — A4216 STERILE WATER/SALINE, 10 ML: HCPCS | Performed by: STUDENT IN AN ORGANIZED HEALTH CARE EDUCATION/TRAINING PROGRAM

## 2022-04-03 PROCEDURE — 63600175 PHARM REV CODE 636 W HCPCS: Performed by: STUDENT IN AN ORGANIZED HEALTH CARE EDUCATION/TRAINING PROGRAM

## 2022-04-03 RX ORDER — HYDRALAZINE HYDROCHLORIDE 20 MG/ML
10 INJECTION INTRAMUSCULAR; INTRAVENOUS ONCE
Status: COMPLETED | OUTPATIENT
Start: 2022-04-03 | End: 2022-04-03

## 2022-04-03 RX ORDER — CLOPIDOGREL BISULFATE 75 MG/1
75 TABLET ORAL DAILY
Qty: 30 TABLET | Refills: 11
Start: 2022-04-04 | End: 2023-04-04

## 2022-04-03 RX ORDER — TRAZODONE HYDROCHLORIDE 50 MG/1
25 TABLET ORAL NIGHTLY PRN
Start: 2022-04-03 | End: 2023-04-03

## 2022-04-03 RX ADMIN — LIDOCAINE 1 PATCH: 50 PATCH CUTANEOUS at 04:04

## 2022-04-03 RX ADMIN — SODIUM CHLORIDE, PRESERVATIVE FREE 10 ML: 5 INJECTION INTRAVENOUS at 06:04

## 2022-04-03 RX ADMIN — HYDRALAZINE HYDROCHLORIDE 10 MG: 20 INJECTION, SOLUTION INTRAMUSCULAR; INTRAVENOUS at 09:04

## 2022-04-03 RX ADMIN — CEFTRIAXONE 1 G: 1 INJECTION, SOLUTION INTRAVENOUS at 09:04

## 2022-04-03 RX ADMIN — ENOXAPARIN SODIUM 40 MG: 100 INJECTION SUBCUTANEOUS at 04:04

## 2022-04-03 RX ADMIN — MIRTAZAPINE 15 MG: 7.5 TABLET ORAL at 08:04

## 2022-04-03 RX ADMIN — ATORVASTATIN CALCIUM 80 MG: 40 TABLET, FILM COATED ORAL at 08:04

## 2022-04-03 RX ADMIN — AMLODIPINE BESYLATE 10 MG: 5 TABLET ORAL at 09:04

## 2022-04-03 RX ADMIN — ASPIRIN 81 MG CHEWABLE TABLET 81 MG: 81 TABLET CHEWABLE at 09:04

## 2022-04-03 RX ADMIN — LISINOPRIL 40 MG: 20 TABLET ORAL at 09:04

## 2022-04-03 RX ADMIN — MUPIROCIN: 20 OINTMENT TOPICAL at 08:04

## 2022-04-03 RX ADMIN — KETOROLAC TROMETHAMINE 15 MG: 30 INJECTION, SOLUTION INTRAMUSCULAR; INTRAVENOUS at 01:04

## 2022-04-03 RX ADMIN — MUPIROCIN: 20 OINTMENT TOPICAL at 09:04

## 2022-04-03 RX ADMIN — ACETAMINOPHEN 650 MG: 325 TABLET ORAL at 12:04

## 2022-04-03 RX ADMIN — CLOPIDOGREL 75 MG: 75 TABLET, FILM COATED ORAL at 09:04

## 2022-04-03 RX ADMIN — SODIUM CHLORIDE, PRESERVATIVE FREE 10 ML: 5 INJECTION INTRAVENOUS at 05:04

## 2022-04-03 RX ADMIN — SODIUM CHLORIDE, PRESERVATIVE FREE 10 ML: 5 INJECTION INTRAVENOUS at 12:04

## 2022-04-03 RX ADMIN — MORPHINE SULFATE 2 MG: 2 INJECTION, SOLUTION INTRAMUSCULAR; INTRAVENOUS at 07:04

## 2022-04-03 NOTE — PROGRESS NOTES
[facility] Medicine  Progress Note    Patient Name: Renee Sultana  MRN: [id number]  Patient Class: IP- Inpatient   Admission Date: 3/31/2022  Length of Stay: 2 days  Attending Physician: Sujey Novak MD  Primary Care Provider: Jarred Miramontes MD    Subjective:     Principal Problem:CVA (cerebral vascular accident)    HPI:  Renee Sultana is a 76 y.o. female with PMH MDD, dementia without behavioral disturbance, HTN, HLD, T2DM who was sent from nursing home with concern for altered mental status. Per report given to ED provider pt noted to be more lethargic than usual at nursing home. On exam pt only complaint is midline low back pain.     In the ED pt hypertensive, all other VSS. UA with positive leukocytes and nitrites. CBC and CMP unremarkable aside from potassium 5.4. CT head without acute process. TSH, troponin, BNP and CK wnl. Pt given dose of IV ceftriaxone and [facility] consulted for admission.     Interval History: NAEON. Pt with no new or worsening complaints.    Review of Systems   Constitutional:  Negative for chills and fever.   Respiratory:  Negative for shortness of breath.    Cardiovascular:  Negative for chest pain.   Gastrointestinal:  Negative for abdominal pain, constipation, diarrhea, nausea and vomiting.   Neurological:  Positive for weakness.     Objective:     Vital Signs (Most Recent):  Temp: 98.1 °F (36.7 °C) (04/03/22 1108)  Pulse: 93 (04/03/22 1200)  Resp: 18 (04/03/22 1108)  BP: (!) 168/74 (04/03/22 1108)  SpO2: 98 % (04/03/22 1108)   Vital Signs (24h Range):  Temp:  [96.4 °F (35.8 °C)-98.1 °F (36.7 °C)] 98.1 °F (36.7 °C)  Pulse:  [64-98] 93  Resp:  [18] 18  SpO2:  [91 %-98 %] 98 %  BP: (130-185)/(72-86) 168/74     Weight: 63 kg (139 lb)  Body mass index is 21.13 kg/m².  No intake or output data in the 24 hours ending 04/03/22 1215     Physical Exam  Vitals and nursing note reviewed.   Constitutional:       Appearance: Normal appearance.   HENT:      Head:  Normocephalic and atraumatic.   Eyes:      General: Visual field deficit present.   Cardiovascular:      Rate and Rhythm: Normal rate.      Heart sounds: Normal heart sounds.   Pulmonary:      Effort: Pulmonary effort is normal.      Breath sounds: Normal breath sounds.   Abdominal:      General: Bowel sounds are normal. There is no distension.      Palpations: Abdomen is soft.      Tenderness: There is no abdominal tenderness.   Musculoskeletal:         General: Normal range of motion.   Skin:     General: Skin is warm and dry.   Neurological:      Mental Status: She is alert.      Cranial Nerves: Facial asymmetry present.      Sensory: Sensory deficit present.      Motor: Weakness present.      Coordination: Coordination abnormal. Finger-Nose-Finger Test abnormal.      Comments: No acute changes from yesterday's exam.       Significant Labs: All pertinent labs within the past 24 hours have been reviewed.    Significant Imaging: I have reviewed all pertinent imaging results/findings within the past 24 hours.    Assessment/Plan:      * CVA (cerebral vascular accident)  - Pt w/ new-onset left-sided weakness  - CT head (3/31) w/ extensive chronic small vessel ischemic disease and cerebral volume loss  - MRI brain w/o contrast (3/31) w/ large acute infarction in the right corona radiata with extension into the right basal ganglia and also involvement of the right hippocampus  - CTA head and neck w/o evidence of major vessel stenosis/occlusion  - Neurology consulted, recommended ASA/Plavix/statin  - Working w/ PT/OT/SLP  - Stable, continue medical management    Allergic rhinitis  - Home Claritin 10 mg daily held on admission, restart if needed    Mixed hyperlipidemia  - Continue home lipitor 80 mg daily  - Home fenofibrate discontinued as pt requires medications to be crushed and fenofibrate cannot be crushed    Type 2 diabetes mellitus, without long-term current use of insulin  Home regimen: Metformin 1g nightly and  glipizide 5mg daily    PLAN:  - Hold home medications  - Detemir 7u qhs  - MDSSI with goal glucose 140-180    Dementia without behavioral disturbance  - Unknown baseline, A&O to self and place only on admission  - Delirium precautions, avoid beers criteria medications  - Continue baseline mechanical soft diet   - Pt is DNR per [place] sent from nursing home; comfort measures only except for antibiotics if antibiotics can prolong life  - Plan for transfer to NH on hospice on 4/4/22    Mild episode of recurrent major depressive disorder  - Continue home Remeron 15mg nightly  - Hold home trazodone 12.5mg as dose unavailable in the hospital and could be contributing to AMS  - Hold home ativan 0.5mg BID PRN, will monitor for signs of benzo withdrawal with q4hr CIWA    Primary hypertension  - Continue home amlodipine 10 mg daily, lisinopril 40 mg daily, and ASA 81 mg daily  - Hold home clonidine patch as dose not written on nursing home orders    Acute cystitis with hematuria  - UA w/ +nitrates, 1+ leukocytes, many bacteria  - UCx w/ NGTD  - No evidence of pyelonephritis on retroperitoneal US  - Completed Rocephin x3 days      VTE Risk Mitigation (From admission, onward)         Ordered     enoxaparin injection 40 mg  Daily         03/31/22 1549     IP VTE HIGH RISK PATIENT  Once         03/31/22 1549     Place sequential compression device  Until discontinued         03/31/22 1549                Discharge Planning   JOSEF:      Code Status: DNR   Is the patient medically ready for discharge?:     Reason for patient still in hospital (select all that apply): Pending disposition  Discharge Plan A: Skilled Nursing Facility        Zoe Mondragon MD  \[facility] [place] [facility]\"" Family Medicine PGY-3  04/03/2022

## 2022-04-03 NOTE — NURSING
Report received from main campus of +blood cultures result, drawn on the 31st aerobic bottle is gram+ cocci & cluster, notified family medicine of results

## 2022-04-03 NOTE — ASSESSMENT & PLAN NOTE
- UA w/ +nitrates, 1+ leukocytes, many bacteria  - UCx w/ NGTD  - No evidence of pyelonephritis on retroperitoneal US  - Completed Rocephin x3 days

## 2022-04-03 NOTE — PROGRESS NOTES
04/03/22 0922   SLP Follow-up   SLP Follow-up? No     4/3/2022: Patients family has elected to initiate hospice care. ST will sign off at this time. MD may continue po diet for pleasure.     Eugenio Boyd M.S., CCC-SLP   Speech Language Pathologist

## 2022-04-03 NOTE — ASSESSMENT & PLAN NOTE
- Unknown baseline, A&O to self and place only on admission  - Delirium precautions, avoid beers criteria medications  - Continue baseline mechanical soft diet   - Pt is DNR per [place] sent from nursing home; comfort measures only except for antibiotics if antibiotics can prolong life  - Plan for transfer to NH on hospice on 4/4/22

## 2022-04-03 NOTE — ASSESSMENT & PLAN NOTE
- Pt w/ new-onset left-sided weakness  - CT head (3/31) w/ extensive chronic small vessel ischemic disease and cerebral volume loss  - MRI brain w/o contrast (3/31) w/ large acute infarction in the right corona radiata with extension into the right basal ganglia and also involvement of the right hippocampus  - CTA head and neck w/o evidence of major vessel stenosis/occlusion  - Neurology consulted, recommended ASA/Plavix/statin  - Working w/ PT/OT/SLP  - Stable, continue medical management

## 2022-04-03 NOTE — ASSESSMENT & PLAN NOTE
- Continue home amlodipine 10 mg daily, lisinopril 40 mg daily, and ASA 81 mg daily  - Hold home clonidine patch as dose not written on nursing home orders

## 2022-04-03 NOTE — SUBJECTIVE & OBJECTIVE
Interval History: NAEON. Pt with no new or worsening complaints.    Review of Systems   Constitutional:  Negative for chills and fever.   Respiratory:  Negative for shortness of breath.    Cardiovascular:  Negative for chest pain.   Gastrointestinal:  Negative for abdominal pain, constipation, diarrhea, nausea and vomiting.   Neurological:  Positive for weakness.     Objective:     Vital Signs (Most Recent):  Temp: 98.1 °F (36.7 °C) (04/03/22 1108)  Pulse: 93 (04/03/22 1200)  Resp: 18 (04/03/22 1108)  BP: (!) 168/74 (04/03/22 1108)  SpO2: 98 % (04/03/22 1108)   Vital Signs (24h Range):  Temp:  [96.4 °F (35.8 °C)-98.1 °F (36.7 °C)] 98.1 °F (36.7 °C)  Pulse:  [64-98] 93  Resp:  [18] 18  SpO2:  [91 %-98 %] 98 %  BP: (130-185)/(72-86) 168/74     Weight: 63 kg (139 lb)  Body mass index is 21.13 kg/m².  No intake or output data in the 24 hours ending 04/03/22 1215     Physical Exam  Vitals and nursing note reviewed.   Constitutional:       Appearance: Normal appearance.   HENT:      Head: Normocephalic and atraumatic.   Eyes:      General: Visual field deficit present.   Cardiovascular:      Rate and Rhythm: Normal rate.      Heart sounds: Normal heart sounds.   Pulmonary:      Effort: Pulmonary effort is normal.      Breath sounds: Normal breath sounds.   Abdominal:      General: Bowel sounds are normal. There is no distension.      Palpations: Abdomen is soft.      Tenderness: There is no abdominal tenderness.   Musculoskeletal:         General: Normal range of motion.   Skin:     General: Skin is warm and dry.   Neurological:      Mental Status: She is alert.      Cranial Nerves: Facial asymmetry present.      Sensory: Sensory deficit present.      Motor: Weakness present.      Coordination: Coordination abnormal. Finger-Nose-Finger Test abnormal.      Comments: No acute changes from yesterday's exam.       Significant Labs: All pertinent labs within the past 24 hours have been reviewed.    Significant Imaging: I have  reviewed all pertinent imaging results/findings within the past 24 hours.

## 2022-04-03 NOTE — CONSULTS
"  [facility] - Telemetry  Adult Nutrition  Consult Note    SUMMARY     Recommendations    Recommendation:   1. If desired addition of diabetic restriction to current diet order with texture/consistency modifcations per SLP/MD.   2. Addition of Boost Pudding BID to supplement intake.   3. Monitor weight/labs   4. RD to follow and monitor intake    Goals:   Pt intake >/= 50% EEN/EPN by RD follow up    Nutrition Goal Status: new  Communication of RD Recs: other (comment) (POC)    Assessment and Plan    Dementia without behavioral disturbance  Contributing Nutrition Diagnosis  Inadequate energy intake    Related to (etiology):   physiological state    Signs and Symptoms (as evidenced by):   Pt with dementia/AMS, decreased intake, and dysphagia    Interventions:  Collaboration with other providers  Commercial Food- Boost Pudding BID    Nutrition Diagnosis Status:   New           Reason for Assessment    Reason For Assessment: consult (Epic made me do it)  Diagnosis: stroke/CVA (CVA)  Relevant Medical History: MDD, dementia without behavioral disturbance, HTN, HLD, T2DM  Interdisciplinary Rounds: did not attend  General Information Comments: Pt receiving dysphagia mech soft diet with honey thick liquids. 50% intake recorded. Admitted with AMS and UTI. Per chart pt to d/c to NH with hospice on 4/4. PIV, PICC. David Score: 16 NFPE not completed 2/2 remote coverage  Nutrition Discharge Planning: d/c with consistent CHO diet and texture/consistency modifications per SLP/MD    Nutrition Risk Screen    Nutrition Risk Screen: dysphagia or difficulty swallowing    Nutrition/Diet History    Food Preferences: LILIA  Spiritual, Cultural Beliefs, Restoration Practices, Values that Affect Care: no  Food Allergies: NKFA  Factors Affecting Nutritional Intake: difficulty/impaired swallowing, impaired cognitive status/motor control    Anthropometrics    Temp: 98.1 °F (36.7 °C)  Height Method: Stated  Height: 5' 8" (172.7 cm)  Height (inches): 68 " in  Weight Method: Bed Scale  Weight: 63 kg (139 lb)  Weight (lb): 139 lb  Ideal Body Weight (IBW), Female: 140 lb  % Ideal Body Weight, Female (lb): 99.29 %  BMI (Calculated): 21.1  BMI Grade: 18.5-24.9 - normal       Lab/Procedures/Meds    Pertinent Labs Reviewed: reviewed  Pertinent Labs Comments: Glu 220, Alb 3.3, A1C 8.7  Pertinent Medications Reviewed: reviewed  Pertinent Medications Comments: amlodipine, aspirin, statin, clopidogrel, enoxaparin, lisinopril, mirtazapine, NaCl      Estimated/Assessed Needs    Weight Used For Calorie Calculations: 63 kg (138 lb 14.2 oz)  Energy Calorie Requirements (kcal): 6625-4551  Energy Need Method: Kcal/kg (20-25 kcal/kg)  Protein Requirements: 63 gm (1.0 gm/kg)  Weight Used For Protein Calculations: 63 kg (138 lb 14.2 oz)     Estimated Fluid Requirement Method: RDA Method (or PER MD)  RDA Method (mL): 1260  CHO Requirement: 140-175 gm/day      Nutrition Prescription Ordered    Current Diet Order: Dysphagia mech soft with honey thick liquids    Evaluation of Received Nutrient/Fluid Intake    I/O: N/A  Energy Calories Required: not meeting needs  Protein Required: not meeting needs  Fluid Required: not meeting needs  Comments: LBM 3/31  % Intake of Estimated Energy Needs: 75 - 100 %  % Meal Intake: 50 - 75 %    Nutrition Risk    Level of Risk/Frequency of Follow-up:  (2x/week)       Monitor and Evaluation    Food and Nutrient Intake: energy intake, food and beverage intake  Food and Nutrient Adminstration: diet order  Knowledge/Beliefs/Attitudes: food and nutrition knowledge/skill  Physical Activity and Function: nutrition-related ADLs and IADLs  Anthropometric Measurements: weight, weight change, body mass index  Biochemical Data, Medical Tests and Procedures: electrolyte and renal panel, gastrointestinal profile, glucose/endocrine profile, inflammatory profile, lipid profile       Nutrition Follow-Up    RD Follow-up?: Yes

## 2022-04-03 NOTE — ASSESSMENT & PLAN NOTE
- Continue home lipitor 80 mg daily  - Home fenofibrate discontinued as pt requires medications to be crushed and fenofibrate cannot be crushed

## 2022-04-03 NOTE — PLAN OF CARE
Plan of care reviewed with patient. Patient voiced understanding. NSR on monitor. No acute distress noted. Side rails x 2, bed in lowest position, call bell within reach, pt advised to call for assistance. Maintain bed alarm for patient safety. Family at bedside.

## 2022-04-03 NOTE — PLAN OF CARE
Recommendation:   1. If desired addition of diabetic restriction to current diet order with texture/consistency modifcations per SLP/MD.   2. Addition of Boost Pudding BID to supplement intake.   3. Monitor weight/labs   4. RD to follow and monitor intake    Goals:   Pt intake >/= 50% EEN/EPN by RD follow up    Nutrition Goal Status: new  Communication of RD Recs: other (comment) (POC)      Problem: Adult Inpatient Plan of Care  Goal: Plan of Care Review  Outcome: Ongoing, Progressing

## 2022-04-03 NOTE — ASSESSMENT & PLAN NOTE
Contributing Nutrition Diagnosis  Inadequate energy intake    Related to (etiology):   physiological state    Signs and Symptoms (as evidenced by):   Pt with dementia/AMS, decreased intake, and dysphagia    Interventions:  Collaboration with other providers  Commercial Food- Boost Pudding BID    Nutrition Diagnosis Status:   New

## 2022-04-04 LAB
POCT GLUCOSE: 188 MG/DL (ref 70–110)
POCT GLUCOSE: 188 MG/DL (ref 70–110)
POCT GLUCOSE: 212 MG/DL (ref 70–110)

## 2022-04-04 PROCEDURE — 25000003 PHARM REV CODE 250: Performed by: STUDENT IN AN ORGANIZED HEALTH CARE EDUCATION/TRAINING PROGRAM

## 2022-04-04 PROCEDURE — 11000001 HC ACUTE MED/SURG PRIVATE ROOM

## 2022-04-04 PROCEDURE — 63600175 PHARM REV CODE 636 W HCPCS: Performed by: STUDENT IN AN ORGANIZED HEALTH CARE EDUCATION/TRAINING PROGRAM

## 2022-04-04 PROCEDURE — 99497 ADVNCD CARE PLAN 30 MIN: CPT | Mod: 25,,, | Performed by: STUDENT IN AN ORGANIZED HEALTH CARE EDUCATION/TRAINING PROGRAM

## 2022-04-04 PROCEDURE — C9399 UNCLASSIFIED DRUGS OR BIOLOG: HCPCS | Performed by: STUDENT IN AN ORGANIZED HEALTH CARE EDUCATION/TRAINING PROGRAM

## 2022-04-04 PROCEDURE — 99223 1ST HOSP IP/OBS HIGH 75: CPT | Mod: ,,, | Performed by: STUDENT IN AN ORGANIZED HEALTH CARE EDUCATION/TRAINING PROGRAM

## 2022-04-04 PROCEDURE — 99223 PR INITIAL HOSPITAL CARE,LEVL III: ICD-10-PCS | Mod: ,,, | Performed by: STUDENT IN AN ORGANIZED HEALTH CARE EDUCATION/TRAINING PROGRAM

## 2022-04-04 PROCEDURE — 99497 PR ADVNCD CARE PLAN 30 MIN: ICD-10-PCS | Mod: 25,,, | Performed by: STUDENT IN AN ORGANIZED HEALTH CARE EDUCATION/TRAINING PROGRAM

## 2022-04-04 PROCEDURE — A4216 STERILE WATER/SALINE, 10 ML: HCPCS | Performed by: STUDENT IN AN ORGANIZED HEALTH CARE EDUCATION/TRAINING PROGRAM

## 2022-04-04 RX ORDER — HYDROCODONE BITARTRATE AND ACETAMINOPHEN 7.5; 325 MG/15ML; MG/15ML
15 SOLUTION ORAL EVERY 4 HOURS PRN
Status: DISCONTINUED | OUTPATIENT
Start: 2022-04-04 | End: 2022-04-04

## 2022-04-04 RX ORDER — LORAZEPAM 0.5 MG/1
0.5 TABLET ORAL 2 TIMES DAILY
Qty: 60 TABLET | Refills: 0 | Status: SHIPPED | OUTPATIENT
Start: 2022-04-04

## 2022-04-04 RX ORDER — LORAZEPAM 0.5 MG/1
0.5 TABLET ORAL 2 TIMES DAILY
Qty: 60 TABLET | Refills: 0 | Status: SHIPPED | OUTPATIENT
Start: 2022-04-04 | End: 2022-04-04 | Stop reason: SDUPTHER

## 2022-04-04 RX ORDER — KETOROLAC TROMETHAMINE 30 MG/ML
15 INJECTION, SOLUTION INTRAMUSCULAR; INTRAVENOUS EVERY 6 HOURS PRN
Status: DISCONTINUED | OUTPATIENT
Start: 2022-04-04 | End: 2022-04-05 | Stop reason: HOSPADM

## 2022-04-04 RX ORDER — MORPHINE SULFATE 2 MG/ML
2 INJECTION, SOLUTION INTRAMUSCULAR; INTRAVENOUS EVERY 4 HOURS PRN
Status: DISCONTINUED | OUTPATIENT
Start: 2022-04-04 | End: 2022-04-05 | Stop reason: HOSPADM

## 2022-04-04 RX ORDER — HYDROCODONE BITARTRATE AND ACETAMINOPHEN 5; 325 MG/1; MG/1
1 TABLET ORAL EVERY 6 HOURS PRN
Status: DISCONTINUED | OUTPATIENT
Start: 2022-04-04 | End: 2022-04-04

## 2022-04-04 RX ADMIN — MIRTAZAPINE 15 MG: 7.5 TABLET ORAL at 08:04

## 2022-04-04 RX ADMIN — ATORVASTATIN CALCIUM 80 MG: 40 TABLET, FILM COATED ORAL at 08:04

## 2022-04-04 RX ADMIN — KETOROLAC TROMETHAMINE 15 MG: 30 INJECTION, SOLUTION INTRAMUSCULAR; INTRAVENOUS at 04:04

## 2022-04-04 RX ADMIN — ACETAMINOPHEN 650 MG: 325 TABLET ORAL at 03:04

## 2022-04-04 RX ADMIN — SODIUM CHLORIDE, PRESERVATIVE FREE 10 ML: 5 INJECTION INTRAVENOUS at 05:04

## 2022-04-04 RX ADMIN — ENOXAPARIN SODIUM 40 MG: 100 INJECTION SUBCUTANEOUS at 04:04

## 2022-04-04 RX ADMIN — INSULIN DETEMIR 7 UNITS: 100 INJECTION, SOLUTION SUBCUTANEOUS at 08:04

## 2022-04-04 RX ADMIN — AMLODIPINE BESYLATE 10 MG: 5 TABLET ORAL at 08:04

## 2022-04-04 RX ADMIN — ASPIRIN 81 MG CHEWABLE TABLET 81 MG: 81 TABLET CHEWABLE at 08:04

## 2022-04-04 RX ADMIN — MUPIROCIN: 20 OINTMENT TOPICAL at 08:04

## 2022-04-04 RX ADMIN — LIDOCAINE 1 PATCH: 50 PATCH CUTANEOUS at 04:04

## 2022-04-04 RX ADMIN — LISINOPRIL 40 MG: 20 TABLET ORAL at 08:04

## 2022-04-04 RX ADMIN — HYDROCODONE BITARTRATE AND ACETAMINOPHEN 15 ML: 7.5; 325 SOLUTION ORAL at 05:04

## 2022-04-04 RX ADMIN — CLOPIDOGREL 75 MG: 75 TABLET, FILM COATED ORAL at 08:04

## 2022-04-04 RX ADMIN — MORPHINE SULFATE 2 MG: 2 INJECTION, SOLUTION INTRAMUSCULAR; INTRAVENOUS at 08:04

## 2022-04-04 NOTE — PT/OT/SLP PROGRESS
Occupational Therapy  D/c OT     Patient Name:  Renee Sultana   MRN:  [id number]    Patient not seen today secondary to Other (Comment) (Pt to d/c back to NH with hospice).    4/4/2022

## 2022-04-04 NOTE — ASSESSMENT & PLAN NOTE
- Unknown baseline, A&O to self and place only on admission  - Delirium precautions, avoid beers criteria medications  - Continue baseline mechanical soft diet   - Pt is DNR per [place] sent from nursing home; comfort measures only except for antibiotics if antibiotics can prolong life  - Plan for transfer to NH on hospice on 4/5/22

## 2022-04-04 NOTE — NURSING
Report given to receiving nurse.  No further questions at this time.  Patient now awaiting ride from [facility] ambulance.

## 2022-04-04 NOTE — CARE UPDATE
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 Pt to be fully awake/alert for all PO  2. HOB at 90* for intake  3. Remain upright at least 30 mins s/p intake- NEEDS Assist for feeding   4. SMALL bites/sips  5. Alternate bites/sips  6. Crush meds and administer in applesauce/pudding  7. Check for pocketing/oral residue on R side of cheek   8. Cue pt to swallow  9. Slow rate of feeding   10. ADD THICKENER TO liquids for HONEY THICK texture  No Jello, ice cream, popsicles, or frozen drinks!  In order to achieve HONEY consistency, mix 1 1/2 packet of thickener with 4oz of liquids.  ALL liquids must be thickened, including broth and soups.  General Precautions: Standard, aspiration, fall (dysarthria)  Communication strategies: Dysarthria, repeat information, listen carefully, provide basic and concrete information to  pt    Activities: Activity as tolerated, fall precautions    Nursing: Per unit    Labs: N/A N/A     CONSULTS:    Physical Therapy to evaluate and treat. , Occupational Therapy to evaluate and treat., Speech Therapy to evaluate and treat for Language, Swallowing and Cognition. and  to evaluate for community resources/long-range planning.    MISCELLANEOUS CARE:  Routine Skin for Bedridden Patients: Apply moisture barrier cream to all skin folds and wet areas in perineal area daily and after baths and all bowel movements. and Diabetes Care:   SN to perform and educate Diabetic management with blood glucose monitoring: and Report CBG < 60 or > 350 to physician.    WOUND CARE ORDERS  None    Medications: Review discharge medications with patient and family and provide education.      Current Discharge Medication List      START taking these medications    Details   clopidogreL (PLAVIX) 75 mg tablet Take 1 tablet (75 mg total) by mouth once daily.  Qty: 30 tablet, Refills: 11      morphine 20 mg/5 mL (4 mg/mL) solution Take 0.6 mLs (2.4 mg total) by mouth every 3 (three) hours as needed for Pain (cough, pleurisy, SOB).  Qty: 100 mL, Refills: 0    Comments: Quantity prescribed more than 7 day supply? Yes, quantity medically necessary      scopolamine (TRANSDERM-SCOP) 1.3-1.5 mg (1 mg over 3 days) Place 1 patch onto the skin Every 3 (three) days.  Qty: 24 patch, Refills: 3         CONTINUE these medications which have CHANGED    Details   LORazepam (ATIVAN) 0.5 MG tablet Take 1 tablet (0.5 mg total) by mouth 2 (two) times daily.  Qty: 60 tablet, Refills: 0      traZODone (DESYREL) 50 MG tablet Take 0.5 tablets (25 mg total) by mouth nightly as needed for Insomnia.         CONTINUE these medications which have NOT CHANGED    Details   amLODIPine (NORVASC) 10 MG tablet Take 10 mg by mouth every morning.      aspirin (ECOTRIN) 81 MG EC tablet Take 81 mg by mouth once daily.      atorvastatin (LIPITOR) 80 MG tablet  Take 80 mg by mouth every evening.      cloNIDine 0.1 mg/24 hr td ptwk (CATAPRES) 0.1 mg/24 hr Place 1 patch onto the skin once a week.      metFORMIN (GLUCOPHAGE) 1000 MG tablet Take 1,000 mg by mouth every evening.      glipiZIDE (GLUCOTROL) 5 MG TR24 Take 5 mg by mouth once daily.      lisinopriL (PRINIVIL,ZESTRIL) 40 MG tablet Take 40 mg by mouth once daily.      mirtazapine (REMERON) 15 MG tablet Take 15 mg by mouth nightly.         STOP taking these medications       fenofibrate (TRICOR) 145 MG tablet Comments:   Reason for Stopping:         loratadine (CLARITIN) 10 mg tablet Comments:   Reason for Stopping:              Immunizations Administered as of 4/5/2022     No immunizations on file.        This patient has had both covid vaccinations    Some patients may experience side effects after vaccination.  These may include fever, headache, muscle or joint aches.  Most symptoms resolve with 24-48 hours and do not require urgent medical evaluation unless they persist for more than 72 hours or symptoms are concerning for an unrelated medical condition.          _________________________________  Hi Estrada MD  04/05/2022

## 2022-04-04 NOTE — CONSULTS
Consult Note  Palliative Care      Consult Requested By: Marco Shafer III, MD  Reason for Consult: Goals of care, Caretaker support    SUBJECTIVE:     History of Present Illness:  Disease Process: Stroke    76F with PMH of HTN, HLD, T2DM, and multi-infarct dementia (first CVA in 2014); has been bedbound and residential status at [facility] x 5 years.    Presented to ED on 3/31 from NH for several days of mental status below baseline. Presented with left hemiparesis, hemineglect, homonous hemianopsia with MRI brain revealing an acute right sided infarct involving the corona radiata, basal ganglia, and likely hippocampus. Deemed outside of TPA window and not a candidate. UA grossly infected. Admitted to  for supportive care.    Speech is limited with flat affect but grossly preserved vocabulary, orientation, cognition. Denies any active complaints. Has been seen by SLP who reports severe oropharyngeal and esophageal dysphagia with dim prospects of recovery given concomitant visual field deficits and dense left spastic hemiparesis. Pt noted to have completed a prior POST stating she would not accept artificial fluids and nutrition.     SLP recommended consideration of pleasure feeds and hospice enrollment upon return to [facility]. Pt has no formal MPOA but her daughter Merle is primary caretaker and has consented to hospice enrollment.     Pt is tentatively planned for discharge today and her daughter Merle is feeling distress over transitions in care, requests palliative input for emotional support.    Reached Merle Betancur at [phone] who reports dissatisfaction with multiple elements of pt's recent care at [facility] including what she describes as inaccurate reporting of baseline neuro status to EMS leading to delayed stroke workup, and discussion of skilled nursing placement apparently due to lack of communication of new comfort focused goals. She has been informed that the contracted hospice agency is  having staffing issues and pt's visits from hospice may be sporadic at best. She is very discouraged by this information and states that her father had a positive experience with hospice but she feels alarmed at the level of care being offered to her mother and worries that she will experience suffering.    Discussed with Merle that it is difficult to find another residential nursing facility while pt has a reservation and is medically clear for discharge today, but transfer to another facility can be initiated on return to [facility]. Likewise pt does not meet inpatient hospice criteria at this time but should she decline she may be a candidate for respite and potentially conversion to full GIP admission.    Given the above the only immediate way to address pt's and caretaker's needs is to refer to an alternate hospice agency and [facility] has agreed to extend a contract to [facility]. Their intake RN Bess is consulted and will meet with Merle and her brother to obtain consent today.    History reviewed. No pertinent past medical history.  History reviewed. No pertinent surgical history.  History reviewed. No pertinent family history.       Mental Status: Oriented x3    ECOG Performance Status Grade: 4 - Completely disabled    Review of Systems:  Positive for thirst and fatigue.    Review of Symptoms    Symptom Assessment (ESAS 0-10 Scale)  Pain:  0  Dyspnea:  0  Anxiety:  0  Nausea:  0  Depression:  0  Anorexia:  0  Fatigue:  7  Insomnia:  0  Restlessness:  0  Agitation:  0     CAM / Delirium:  Negative  Constipation:  Negative  Diarrhea:  Negative    Bowel Management Plan (BMP):  No      Modified Prosper Scale:  0    ECOG Performance Status thGthrthathdtheth:th th5th Living Arrangements:  Lives in nursing home    Psychosocial/Cultural: Unable to fully assess; paucity of speech. Affect labile when discussing her daughter's extensive nursing experience and caretaker role for pt.    Spiritual:  F - Mary and Belief:  Unable to  fully assess; paucity of speech.    I - Importance:  Unable to fully assess; paucity of speech.    C - Community:  Well supported by daughter Merle who is an experienced RN.  A - Address in Care:  Attempting to contact Merle for emotional support.     Time-Based Charting:  Yes  Chart Review: 33 minutes  Face to Face: 18 minutes  Symptom Assessment: 15 minutes  Coordination of Care: 5 minutes  Discharge Planning: 10 minutes  Advance Care Plannin minutes  Goals of Care: 21 minutes    Total Time Spent: 121 minutes      Advance Care Planning   Advance Directives:   Living Will: No    [place]POST: Yes (not on file)    Do Not Resuscitate Status: Yes    Medical Power of : No    Agent's Name:  Merle eBtancur (daughter)   Agent's Contact Number:  [phone]    Decision Making:  Patient answered questions and Family answered questions         OBJECTIVE:     Physical Exam  Gen: Frail, pale elderly female lying in bed in NAD, AAOx3 with flat affect  HEENT: Left facial weakness. PERRL. Right gaze preference, does not cross midline.   Pulm: CTAB anteriorly  CV: RRR S1S2 with 2/6 systolic murmur at LUSB  has incomplete left hemineglect (acknowledges her own left hand)  Pulm: CTAB anteriorly no r/w/r, unlabored, symmetric  Abdomen: Soft, nt/nd with normoactive bowel sounds  Psych: Flat affect, paucity of speech with age appropriate vocab/cognition and goal oriented thought processes  Neuro: CN deficits as in HEENT; additionally has dense spastic left hemiparesis and incomplete left hemineglect (acknowledges her own left hand).    ASSESSMENT/PLAN:     76F with PMH of HTN, HLD, T2DM, and multi-infarct dementia, bedbound at baseline, admitted to  for acute right WM/BG infarct with left hemiparesis/hemineglect and severe dysphagia. Dim prospects for rehab given baseline debility and severity of new deficits. Recommended for pleasure feeds and transition to hospice and family is broadly in agreement but with recent  emotional distress over looming transition of care. Palliative consulted for family decisional support.    Family has rational concerns about impediments to care reported secondary to staff shortages at [facility] contracted hospice agency that must be addressed asap. Referred to [facility] for further management.    Recommendations:  Medical: finished IV abx and PICC is removed; can crush meds for secondary stroke ppx as life limiting hospice dx is dysphagia  Symptom Management:   # Secretions  - recommend scopolamine patch given daughter reports witnessed aspiration of saliva  # Dsyphagia   - no active discomfort; when transitioned to hospice recommend PRN sublingual morphine concentrate 20mg/mL for cough, pleurisy, SOB  Psychosocial: cognitively intact with psychomotor slowing  Legal: both adult children are NOK; daughter Merle is an RN and point person for decision making  Prognosis: 2-4 weeks and hospice qualified for dx of severe dsyphagia with wishes against artificial fluids/nutrition reiterated by pt at bedside    Hollis Skinner MD  Hospice and Palliative Medicine  Palliative Care Pager: [phone]    Advance Care Planning     Date: 04/04/2022    Code Status  In light of the patients advanced and life limiting illness,I engaged the the patient and family in a conversation about the patient's preferences for care  at the very end of life. The patient wishes to have a natural, peaceful death.  Along those lines, the patient does not wish to have CPR or other invasive treatments performed when her heart and/or breathing stops. I communicated to the patient and family that a DNR order would be placed in her medical record to reflect this preference.  I spent a total of 40 minutes engaging the patient in this advance care planning discussion.       [facility]  I engaged the patient and family in a conversation about advance care planning and we specifically addressed what the goals of care would be moving  forward, in light of the patient's change in clinical status, specifically severe dsyphagia.  We did specifically address the patient's likely prognosis, which is poor.  We explored the patient's values and preferences for future care.  The patient and family endorses that what is most important right now is to focus on avoiding the hospital, remaining as independent as possible, symptom/pain control and quality of life, even if it means sacrificing a little time    Accordingly, we have decided that the best plan to meet the patient's goals includes enrolling in hospice care    I did explain the role for hospice care at this stage of the patient's illness, including its ability to help the patient live with the best quality of life possible.  We will be making a hospice referral.    I spent a total of 40 minutes engaging the patient in this advance care planning discussion.

## 2022-04-04 NOTE — PROGRESS NOTES
[facility] Medicine  Progress Note    Patient Name: Renee Sultana  MRN: [id number]  Patient Class: IP- Inpatient   Admission Date: 3/31/2022  Length of Stay: 3 days  Attending Physician: Sujey Novak MD  Primary Care Provider: Jarred Miramontes MD        Subjective:     Principal Problem:CVA (cerebral vascular accident)        HPI:  Renee Sultana is a 76 y.o. female with PMH MDD, dementia without behavioral disturbance, HTN, HLD, T2DM who was sent from [facility] home with concern for altered mental status. Per report given to ED provider pt noted to be more lethargic than usual at nursing home. On exam pt only complaint is midline low back pain.     In the ED pt hypertensive, all other VSS. UA with positive leukocytes and nitrites. CBC and CMP unremarkable aside from potassium 5.4. CT head without acute process. TSH, troponin, BNP and CK wnl. Pt given dose of IV ceftriaxone and [facility] consulted for admission.       Overview/Hospital Course:  Pt admitted to [facility] for further evaluation and treatment. Pt noted to have new-onset left-sided weakness. CT head w/ extensive chronic small vessel ischemic disease and cerebral volume loss. MRI brain w/o contrast w/ large acute infarction in the right corona radiata with extension into the right basal ganglia and also involvement of the right hippocampus. CTA head and neck w/o evidence of major vessel stenosis/occlusion. Neurology consulted, recommended ASA/Plavix/statin with plan for continuation of medications on eventual discharge. SLP consulted and recommended mechanical soft diet w/ honey thick liquids and meds crushed in apple sauce/pudding. Home fenofibrate was discontinued as it could not be crushed. Pt completed treatment course for UTI w/ resolution of symptoms (Rocephin x3 days). Per pt and pt's family, plan for discharge back to [facility] and pursue hospice as outpatient. On day of discharge, pt afebrile, hemodynamically  stable. Pt agreeable to plan, expressed understanding, all questions answered.       Interval History: NAEON. Pt with no new or worsening complaints.Continues to tolerate honey thick.    Review of Systems   Constitutional:  Negative for chills and fever.   Respiratory:  Negative for shortness of breath.    Cardiovascular:  Negative for chest pain.   Gastrointestinal:  Negative for abdominal pain, constipation, diarrhea, nausea and vomiting.   Neurological:  Positive for weakness.     Objective:     Vital Signs (Most Recent):  Temp: 96.4 °F (35.8 °C) (04/04/22 0733)  Pulse: 97 (04/04/22 0733)  Resp: 18 (04/04/22 0733)  BP: (!) 143/70 (04/04/22 0733)  SpO2: 95 % (04/04/22 0605)   Vital Signs (24h Range):  Temp:  [96.4 °F (35.8 °C)-98.1 °F (36.7 °C)] 96.4 °F (35.8 °C)  Pulse:  [] 97  Resp:  [16-18] 18  SpO2:  [92 %-98 %] 95 %  BP: (130-185)/(58-98) 143/70     Weight: 63 kg (139 lb)  Body mass index is 21.13 kg/m².    Intake/Output Summary (Last 24 hours) at 4/4/2022 0856  Last data filed at 4/4/2022 0642  Gross per 24 hour   Intake --   Output 350 ml   Net -350 ml        Physical Exam  Vitals and nursing note reviewed.   Constitutional:       Appearance: Normal appearance.   HENT:      Head: Normocephalic and atraumatic.   Eyes:      General: Visual field deficit present.   Cardiovascular:      Rate and Rhythm: Normal rate.      Heart sounds: Normal heart sounds.   Pulmonary:      Effort: Pulmonary effort is normal.      Breath sounds: Normal breath sounds.   Abdominal:      General: Bowel sounds are normal. There is no distension.      Palpations: Abdomen is soft.      Tenderness: There is no abdominal tenderness.   Musculoskeletal:         General: Normal range of motion.   Skin:     General: Skin is warm and dry.   Neurological:      Mental Status: She is alert.      Cranial Nerves: Facial asymmetry present.      Sensory: Sensory deficit present.      Motor: Weakness present.      Coordination: Coordination  abnormal. Finger-Nose-Finger Test abnormal.      Comments: No acute changes from yesterday's exam.       Significant Labs: All pertinent labs within the past 24 hours have been reviewed.    Significant Imaging: I have reviewed all pertinent imaging results/findings within the past 24 hours.      Assessment/Plan:      * CVA (cerebral vascular accident)  - Pt w/ new-onset left-sided weakness  - CT head (3/31) w/ extensive chronic small vessel ischemic disease and cerebral volume loss  - MRI brain w/o contrast (3/31) w/ large acute infarction in the right corona radiata with extension into the right basal ganglia and also involvement of the right hippocampus  - CTA head and neck w/o evidence of major vessel stenosis/occlusion  - Neurology consulted, recommended ASA/Plavix/statin  - Working w/ PT/OT/SLP  - Stable, continue medical management    Allergic rhinitis  - Home Claritin 10 mg daily held on admission, restart if needed    Mixed hyperlipidemia  - Continue home lipitor 80 mg daily  - Home fenofibrate discontinued as pt requires medications to be crushed and fenofibrate cannot be crushed    Type 2 diabetes mellitus, without long-term current use of insulin  Home regimen: Metformin 1g nightly and glipizide 5mg daily    PLAN:  - Hold home medications  - Detemir 7u qhs  - MDSSI with goal glucose 140-180    Dementia without behavioral disturbance  - Unknown baseline, A&O to self and place only on admission  - Delirium precautions, avoid beers criteria medications  - Continue baseline mechanical soft diet   - Pt is DNR per [place] sent from nursing home; comfort measures only except for antibiotics if antibiotics can prolong life  - Plan for transfer to NH on hospice on 4/4/22    Mild episode of recurrent major depressive disorder  - Continue home Remeron 15mg nightly  - Hold home trazodone 12.5mg as dose unavailable in the hospital and could be contributing to AMS  - Hold home ativan 0.5mg BID PRN, will monitor for  signs of benzo withdrawal with q4hr CIWA    Primary hypertension  - Continue home amlodipine 10 mg daily, lisinopril 40 mg daily, and ASA 81 mg daily  - Hold home clonidine patch as dose not written on nursing home orders      VTE Risk Mitigation (From admission, onward)         Ordered     enoxaparin injection 40 mg  Daily         03/31/22 1549     IP VTE HIGH RISK PATIENT  Once         03/31/22 1549     Place sequential compression device  Until discontinued         03/31/22 1549                Discharge Planning   JOSEF:      Code Status: DNR   Is the patient medically ready for discharge?:     Reason for patient still in hospital (select all that apply): Pending disposition  Discharge Plan A: Skilled Nursing Facility        Hi Estrada MD  Department of Hospital Medicine   [facility]

## 2022-04-04 NOTE — SUBJECTIVE & OBJECTIVE
Interval History: NAEON. Pt with no new or worsening complaints. Plan was to discharge patient to [facility] for hospice however ts family expressed concern over the level of care available at that facility. Pt is now planned for hospice with [facility] with discharge tomorrow.      Review of Systems   Constitutional:  Negative for chills and fever.   Respiratory:  Negative for shortness of breath.    Cardiovascular:  Negative for chest pain.   Gastrointestinal:  Negative for abdominal pain, constipation, diarrhea, nausea and vomiting.   Neurological:  Positive for weakness.     Objective:     Vital Signs (Most Recent):  Temp: 98.2 °F (36.8 °C) (04/04/22 1509)  Pulse: 91 (04/04/22 1509)  Resp: 15 (04/04/22 1509)  BP: (!) 141/70 (04/04/22 1509)  SpO2: 95 % (04/04/22 1509)   Vital Signs (24h Range):  Temp:  [96.4 °F (35.8 °C)-98.9 °F (37.2 °C)] 98.2 °F (36.8 °C)  Pulse:  [] 91  Resp:  [15-18] 15  SpO2:  [92 %-98 %] 95 %  BP: (130-183)/(58-86) 141/70     Weight: 63 kg (139 lb)  Body mass index is 21.13 kg/m².    Intake/Output Summary (Last 24 hours) at 4/4/2022 1711  Last data filed at 4/4/2022 1415  Gross per 24 hour   Intake 480 ml   Output 350 ml   Net 130 ml          Physical Exam  Vitals and nursing note reviewed.   Constitutional:       Appearance: Normal appearance.   HENT:      Head: Normocephalic and atraumatic.   Eyes:      General: Visual field deficit present.   Cardiovascular:      Rate and Rhythm: Normal rate.      Heart sounds: Normal heart sounds.   Pulmonary:      Effort: Pulmonary effort is normal.      Breath sounds: Normal breath sounds.   Abdominal:      General: Bowel sounds are normal. There is no distension.      Palpations: Abdomen is soft.      Tenderness: There is no abdominal tenderness.   Musculoskeletal:         General: Normal range of motion.   Skin:     General: Skin is warm and dry.   Neurological:      Mental Status: She is alert.      Cranial Nerves: Facial asymmetry present.       Sensory: Sensory deficit present.      Motor: Weakness present.      Coordination: Coordination abnormal. Finger-Nose-Finger Test abnormal.      Comments: No acute changes from yesterday's exam.       Significant Labs: All pertinent labs within the past 24 hours have been reviewed.    Significant Imaging: I have reviewed all pertinent imaging results/findings within the past 24 hours.

## 2022-04-04 NOTE — PLAN OF CARE
04/04/22 1156   Post-Acute Status   Post-Acute Authorization Placement;Hospice   Post-Acute Placement Status Set-up Complete/Auth obtained  (TN called to update daughter this am with no answer. TN recieved a call back from daughter who now voices reservations and concerns with her mother returning to [facility]. TN encouraged her to speak with administration in facility. Attending team notified.)   Hospice Status Referrals Sent   Discharge Plan   Discharge Plan A Return to nursing home     TN educated that transfer from facility can be worked on from home facility as pt is discharged and medically ready for DC today. Reports has already been called to facility. Supervisor made aware. Transport pushed back to 3 pm.

## 2022-04-04 NOTE — PLAN OF CARE
"   04/04/22 1523   Post-Acute Status   Post-Acute Authorization Placement;Hospice   Post-Acute Placement Status Patient declined/refused  (Daughter currently declines for pt to return to [facility] at present. IP hospice referrals sent. Pending pt and family preference.)   Hospice Status Pending post acute provider review/more information requested  ([facility] is being [facility]. They had discussion via telephone. Have meeting for 5 pm today. Unable to complete DC for today. Attending team notified.)   Discharge Delays (!) Post-Acute Set-up   Discharge Plan   Discharge Plan A Return to nursing home;Hospice/home     No future appointments.    BP (!) 141/70 (BP Location: Left arm, Patient Position: Lying)   Pulse 91   Temp 98.2 °F (36.8 °C) (Oral)   Resp 15   Ht 5' 8" (1.727 m)   Wt 63 kg (139 lb)   SpO2 95%   Breastfeeding No   BMI 21.13 kg/m²      amLODIPine  10 mg Oral Daily    aspirin  81 mg Oral Daily    atorvastatin  80 mg Oral QHS    clopidogreL  75 mg Oral Daily    enoxaparin  40 mg Subcutaneous Daily    insulin detemir U-100  7 Units Subcutaneous Daily    LIDOcaine  1 patch Transdermal Q24H    lisinopriL  40 mg Oral Daily    mirtazapine  15 mg Oral Nightly    mupirocin   Nasal BID    sodium chloride 0.9%  10 mL Intravenous Q6H       "

## 2022-04-04 NOTE — SUBJECTIVE & OBJECTIVE
Interval History: NAEON. Pt with no new or worsening complaints.Continues to tolerate honey thick.    Review of Systems   Constitutional:  Negative for chills and fever.   Respiratory:  Negative for shortness of breath.    Cardiovascular:  Negative for chest pain.   Gastrointestinal:  Negative for abdominal pain, constipation, diarrhea, nausea and vomiting.   Neurological:  Positive for weakness.     Objective:     Vital Signs (Most Recent):  Temp: 96.4 °F (35.8 °C) (04/04/22 0733)  Pulse: 97 (04/04/22 0733)  Resp: 18 (04/04/22 0733)  BP: (!) 143/70 (04/04/22 0733)  SpO2: 95 % (04/04/22 0605)   Vital Signs (24h Range):  Temp:  [96.4 °F (35.8 °C)-98.1 °F (36.7 °C)] 96.4 °F (35.8 °C)  Pulse:  [] 97  Resp:  [16-18] 18  SpO2:  [92 %-98 %] 95 %  BP: (130-185)/(58-98) 143/70     Weight: 63 kg (139 lb)  Body mass index is 21.13 kg/m².    Intake/Output Summary (Last 24 hours) at 4/4/2022 0856  Last data filed at 4/4/2022 0642  Gross per 24 hour   Intake --   Output 350 ml   Net -350 ml        Physical Exam  Vitals and nursing note reviewed.   Constitutional:       Appearance: Normal appearance.   HENT:      Head: Normocephalic and atraumatic.   Eyes:      General: Visual field deficit present.   Cardiovascular:      Rate and Rhythm: Normal rate.      Heart sounds: Normal heart sounds.   Pulmonary:      Effort: Pulmonary effort is normal.      Breath sounds: Normal breath sounds.   Abdominal:      General: Bowel sounds are normal. There is no distension.      Palpations: Abdomen is soft.      Tenderness: There is no abdominal tenderness.   Musculoskeletal:         General: Normal range of motion.   Skin:     General: Skin is warm and dry.   Neurological:      Mental Status: She is alert.      Cranial Nerves: Facial asymmetry present.      Sensory: Sensory deficit present.      Motor: Weakness present.      Coordination: Coordination abnormal. Finger-Nose-Finger Test abnormal.      Comments: No acute changes from  yesterday's exam.       Significant Labs: All pertinent labs within the past 24 hours have been reviewed.    Significant Imaging: I have reviewed all pertinent imaging results/findings within the past 24 hours.

## 2022-04-04 NOTE — DISCHARGE SUMMARY
[facility] Medicine  Discharge Summary      Patient Name: Renee Sultana  MRN: [id number]  Patient Class: IP- Inpatient  Admission Date: 3/31/2022  Hospital Length of Stay: 3 days  Discharge Date and Time:  04/05/2022 11:24 AM  Attending Physician: Marco Shafer III, MD   Discharging Provider: Hi Estrada MD  Primary Care Provider: Jarred Miramontes MD      HPI:   Renee Sultana is a 76 y.o. female with PMH MDD, dementia without behavioral disturbance, HTN, HLD, T2DM who was sent from nursing home with concern for altered mental status. Per report given to ED provider pt noted to be more lethargic than usual at [facility] home. On exam pt only complaint is midline low back pain.     In the ED pt hypertensive, all other VSS. UA with positive leukocytes and nitrites. CBC and CMP unremarkable aside from potassium 5.4. CT head without acute process. TSH, troponin, BNP and CK wnl. Pt given dose of IV ceftriaxone and [facility] consulted for admission.       * No surgery found *      Hospital Course:   Pt admitted to [facility] for further evaluation and treatment. Pt noted to have new-onset left-sided weakness. CT head w/ extensive chronic small vessel ischemic disease and cerebral volume loss. MRI brain w/o contrast w/ large acute infarction in the right corona radiata with extension into the right basal ganglia and also involvement of the right hippocampus. CTA head and neck w/o evidence of major vessel stenosis/occlusion. Neurology consulted, recommended ASA/Plavix/statin with plan for continuation of medications on eventual discharge. SLP consulted and recommended mechanical soft diet w/ honey thick liquids and meds crushed in apple sauce/pudding. Home fenofibrate was discontinued as it could not be crushed. Pt completed treatment course for UTI w/ resolution of symptoms (Rocephin x3 days). Per pt and pt's family, plan for discharge back to [facility] and pursue hospice as outpatient. On day  of discharge, pt afebrile, hemodynamically stable. Pt agreeable to plan, expressed understanding, all questions answered.        Goals of Care Treatment Preferences:  Code Status: DNR    Physical Exam  Vitals and nursing note reviewed.   Constitutional:       Appearance: Normal appearance.   HENT:      Head: Normocephalic and atraumatic.   Eyes:      General: Visual field deficit present.   Cardiovascular:      Rate and Rhythm: Normal rate.      Heart sounds: Normal heart sounds.   Pulmonary:      Effort: Pulmonary effort is normal.      Breath sounds: Normal breath sounds.   Abdominal:      General: Bowel sounds are normal. There is no distension.      Palpations: Abdomen is soft.      Tenderness: There is no abdominal tenderness.   Musculoskeletal:         General: Normal range of motion.   Skin:     General: Skin is warm and dry.   Neurological:      Mental Status: She is alert.      Cranial Nerves: Facial asymmetry present.      Sensory: Sensory deficit present.      Motor: Weakness present.      Coordination: Coordination abnormal. Finger-Nose-Finger Test abnormal.      Comments: No acute changes from yesterday's exam.    Consults:   Consults (From admission, onward)        Status Ordering Provider     Inpatient consult to Registered Dietitian/Nutritionist  Once        Provider:  (Not yet assigned)    Completed ELIECER HAYES     Inpatient consult to Palliative Care  Once        Provider:  (Not yet assigned)    Acknowledged CORINA WILKINS     Inpatient consult to PICC team (NIAS)  Once        Provider:  (Not yet assigned)    Acknowledged SOFI WAY     IP consult to case management  Once        Provider:  (Not yet assigned)    Acknowledged ELIECER HAYES     Inpatient consult to Neurology  Once        Provider:  (Not yet assigned)    Completed ANITA MANZANARES          No new Assessment & Plan notes have been filed under this hospital service since the last note was generated.  Service: [facility]  Medicine    Final Active Diagnoses:    Diagnosis Date Noted POA    PRINCIPAL PROBLEM:  CVA (cerebral vascular accident) [I63.9] 03/31/2022 Yes    Primary hypertension [I10] 03/31/2022 Yes    Mild episode of recurrent major depressive disorder [F33.0] 03/31/2022 Yes    Dementia without behavioral disturbance [F03.90] 03/31/2022 Yes    Type 2 diabetes mellitus, without long-term current use of insulin [E11.9] 03/31/2022 Yes    Mixed hyperlipidemia [E78.2] 03/31/2022 Yes    Allergic rhinitis [J30.9] 03/31/2022 Yes      Problems Resolved During this Admission:    Diagnosis Date Noted Date Resolved POA    Acute cystitis with hematuria [N30.01] 03/31/2022 04/03/2022 Yes       Discharged Condition: stable    Disposition: Home or Self Care    Follow Up:    Patient Instructions:   No discharge procedures on file.    Significant Diagnostic Studies: Labs: [facility] No results for input(s): NA, K, CL, CO2, GLU, BUN, CREATININE, CALCIUM, PROT, ALBUMIN, BILITOT, ALKPHOS, AST, ALT, ANIONGAP, ESTGFRAFRICA, EGFRNONAA in the last 48 hours.    Pending Diagnostic Studies:     None         Medications:  Reconciled Home Medications:      Medication List      START taking these medications    clopidogreL 75 mg tablet  Commonly known as: PLAVIX  Take 1 tablet (75 mg total) by mouth once daily.     morphine 20 mg/5 mL (4 mg/mL) solution  Take 0.6 mLs (2.4 mg total) by mouth every 3 (three) hours as needed for Pain (cough, pleurisy, SOB).     scopolamine 1.3-1.5 mg (1 mg over 3 days)  Commonly known as: TRANSDERM-SCOP  Place 1 patch onto the skin Every 3 (three) days.        CHANGE how you take these medications    traZODone 50 MG tablet  Commonly known as: DESYREL  Take 0.5 tablets (25 mg total) by mouth nightly as needed for Insomnia.  What changed:   · how much to take  · when to take this  · reasons to take this  · additional instructions        CONTINUE taking these medications    amLODIPine 10 MG tablet  Commonly known as:  NORVASC  Take 10 mg by mouth every morning.     aspirin 81 MG EC tablet  Commonly known as: ECOTRIN  Take 81 mg by mouth once daily.     atorvastatin 80 MG tablet  Commonly known as: LIPITOR  Take 80 mg by mouth every evening.     cloNIDine 0.1 mg/24 hr td ptwk 0.1 mg/24 hr  Commonly known as: CATAPRES  Place 1 patch onto the skin once a week.     glipiZIDE 5 MG Tr24  Commonly known as: GLUCOTROL  Take 5 mg by mouth once daily.     lisinopriL 40 MG tablet  Commonly known as: PRINIVIL,ZESTRIL  Take 40 mg by mouth once daily.     LORazepam 0.5 MG tablet  Commonly known as: ATIVAN  Take 1 tablet (0.5 mg total) by mouth 2 (two) times daily.     metFORMIN 1000 MG tablet  Commonly known as: GLUCOPHAGE  Take 1,000 mg by mouth every evening.     mirtazapine 15 MG tablet  Commonly known as: REMERON  Take 15 mg by mouth nightly.        STOP taking these medications    fenofibrate 145 MG tablet  Commonly known as: TRICOR     loratadine 10 mg tablet  Commonly known as: CLARITIN            Indwelling Lines/Drains at time of discharge:   Lines/Drains/Airways     Peripherally Inserted Central Catheter Line  Duration           PICC Double Lumen 04/01/22 1245 right basilic 2 days                Time spent on the discharge of patient: 40 minutes      Hi Estrada MD  Department of Hospital Medicine  [facility]

## 2022-04-04 NOTE — NURSING
Discharge instructions given to patient.  Patient verbalized understanding and had no further questions.  Patient's picc line, tele removed per MD orders.  Patient now awaiting ambulance for discharge to facility.  Will continue to monitor.

## 2022-04-04 NOTE — NURSING
Patient complaining of 7/10 back pain.  Patient given tylenol and lidocaine patch with no relief.  Physician notified, new orders were for IM torodol and PO norco for breakthrough.  Will administer and continue to monitor.

## 2022-04-04 NOTE — PT/OT/SLP PROGRESS
Physical Therapy  Discharge    Patient Name:  Renee Sultana   MRN:  [id number]    PT orders discontinued this date as per chart, plan is for pt to d/c to NH with hospice.    4/4/2022

## 2022-04-04 NOTE — PROGRESS NOTES
[facility] Medicine  Progress Note    Patient Name: Renee Sultana  MRN: [id number]  Patient Class: IP- Inpatient   Admission Date: 3/31/2022  Length of Stay: 3 days  Attending Physician: Marco Shafer III, MD  Primary Care Provider: Jarred Miramontes MD        Subjective:     Principal Problem:CVA (cerebral vascular accident)        HPI:  Renee Sultana is a 76 y.o. female with PMH MDD, dementia without behavioral disturbance, HTN, HLD, T2DM who was sent from [facility] home with concern for altered mental status. Per report given to ED provider pt noted to be more lethargic than usual at nursing home. On exam pt only complaint is midline low back pain.     In the ED pt hypertensive, all other VSS. UA with positive leukocytes and nitrites. CBC and CMP unremarkable aside from potassium 5.4. CT head without acute process. TSH, troponin, BNP and CK wnl. Pt given dose of IV ceftriaxone and [facility] consulted for admission.       Overview/Hospital Course:  Pt admitted to [facility] for further evaluation and treatment. Pt noted to have new-onset left-sided weakness. CT head w/ extensive chronic small vessel ischemic disease and cerebral volume loss. MRI brain w/o contrast w/ large acute infarction in the right corona radiata with extension into the right basal ganglia and also involvement of the right hippocampus. CTA head and neck w/o evidence of major vessel stenosis/occlusion. Neurology consulted, recommended ASA/Plavix/statin with plan for continuation of medications on eventual discharge. SLP consulted and recommended mechanical soft diet w/ honey thick liquids and meds crushed in apple sauce/pudding. Home fenofibrate was discontinued as it could not be crushed. Pt completed treatment course for UTI w/ resolution of symptoms (Rocephin x3 days). Per pt and pt's family, plan for discharge back to [facility] and pursue hospice as outpatient. On day of discharge, pt afebrile, hemodynamically  stable. Pt agreeable to plan, expressed understanding, all questions answered.       Interval History: NAEON. Pt with no new or worsening complaints. Plan was to discharge patient to [facility] for hospice however ts family expressed concern over the level of care available at that facility. Pt is now planned for hospice with [facility] with discharge tomorrow.      Review of Systems   Constitutional:  Negative for chills and fever.   Respiratory:  Negative for shortness of breath.    Cardiovascular:  Negative for chest pain.   Gastrointestinal:  Negative for abdominal pain, constipation, diarrhea, nausea and vomiting.   Neurological:  Positive for weakness.     Objective:     Vital Signs (Most Recent):  Temp: 98.2 °F (36.8 °C) (04/04/22 1509)  Pulse: 91 (04/04/22 1509)  Resp: 15 (04/04/22 1509)  BP: (!) 141/70 (04/04/22 1509)  SpO2: 95 % (04/04/22 1509)   Vital Signs (24h Range):  Temp:  [96.4 °F (35.8 °C)-98.9 °F (37.2 °C)] 98.2 °F (36.8 °C)  Pulse:  [] 91  Resp:  [15-18] 15  SpO2:  [92 %-98 %] 95 %  BP: (130-183)/(58-86) 141/70     Weight: 63 kg (139 lb)  Body mass index is 21.13 kg/m².    Intake/Output Summary (Last 24 hours) at 4/4/2022 1711  Last data filed at 4/4/2022 1415  Gross per 24 hour   Intake 480 ml   Output 350 ml   Net 130 ml          Physical Exam  Vitals and nursing note reviewed.   Constitutional:       Appearance: Normal appearance.   HENT:      Head: Normocephalic and atraumatic.   Eyes:      General: Visual field deficit present.   Cardiovascular:      Rate and Rhythm: Normal rate.      Heart sounds: Normal heart sounds.   Pulmonary:      Effort: Pulmonary effort is normal.      Breath sounds: Normal breath sounds.   Abdominal:      General: Bowel sounds are normal. There is no distension.      Palpations: Abdomen is soft.      Tenderness: There is no abdominal tenderness.   Musculoskeletal:         General: Normal range of motion.   Skin:     General: Skin is warm and dry.   Neurological:       Mental Status: She is alert.      Cranial Nerves: Facial asymmetry present.      Sensory: Sensory deficit present.      Motor: Weakness present.      Coordination: Coordination abnormal. Finger-Nose-Finger Test abnormal.      Comments: No acute changes from yesterday's exam.       Significant Labs: All pertinent labs within the past 24 hours have been reviewed.    Significant Imaging: I have reviewed all pertinent imaging results/findings within the past 24 hours.      Assessment/Plan:      * CVA (cerebral vascular accident)  - Pt w/ new-onset left-sided weakness  - CT head (3/31) w/ extensive chronic small vessel ischemic disease and cerebral volume loss  - MRI brain w/o contrast (3/31) w/ large acute infarction in the right corona radiata with extension into the right basal ganglia and also involvement of the right hippocampus  - CTA head and neck w/o evidence of major vessel stenosis/occlusion  - Neurology consulted, recommended ASA/Plavix/statin  - Working w/ PT/OT/SLP  - Stable, continue medical management    Allergic rhinitis  - Home Claritin 10 mg daily held on admission, restart if needed    Mixed hyperlipidemia  - Continue home lipitor 80 mg daily  - Home fenofibrate discontinued as pt requires medications to be crushed and fenofibrate cannot be crushed    Type 2 diabetes mellitus, without long-term current use of insulin  Home regimen: Metformin 1g nightly and glipizide 5mg daily    PLAN:  - Hold home medications  - Detemir 7u qhs  - MDSSI with goal glucose 140-180    Dementia without behavioral disturbance  - Unknown baseline, A&O to self and place only on admission  - Delirium precautions, avoid beers criteria medications  - Continue baseline mechanical soft diet   - Pt is DNR per [place] sent from nursing home; comfort measures only except for antibiotics if antibiotics can prolong life  - Plan for transfer to NH on hospice on 4/5/22    Mild episode of recurrent major depressive disorder  - Continue  home Remeron 15mg nightly  - Hold home trazodone 12.5mg as dose unavailable in the hospital and could be contributing to AMS  - Hold home ativan 0.5mg BID PRN, will monitor for signs of benzo withdrawal with q4hr CIWA    Primary hypertension  - Continue home amlodipine 10 mg daily, lisinopril 40 mg daily, and ASA 81 mg daily  - Hold home clonidine patch as dose not written on nursing home orders      VTE Risk Mitigation (From admission, onward)         Ordered     enoxaparin injection 40 mg  Daily         03/31/22 1549     IP VTE HIGH RISK PATIENT  Once         03/31/22 1549     Place sequential compression device  Until discontinued         03/31/22 1549                Discharge Planning   JOSEF: 4/4/2022     Code Status: DNR   Is the patient medically ready for discharge?:     Reason for patient still in hospital (select all that apply): Pending disposition  Discharge Plan A: Return to nursing home, Hospice/home   Discharge Delays: (!) Post-Acute Set-up      Hi Estrada MD  Department of Hospital Medicine   [facility]

## 2022-04-04 NOTE — PLAN OF CARE
"Pharmacist will go over home medications and reasons for medications. VN and bedside nurse to reiterate final discharge instructions.     BP (!) 143/70 (BP Location: Right arm, Patient Position: Lying)   Pulse 97   Temp 96.4 °F (35.8 °C) (Oral)   Resp 18   Ht 5' 8" (1.727 m)   Wt 63 kg (139 lb)   SpO2 95%   Breastfeeding No   BMI 21.13 kg/m²        04/04/22 0900   Final Note   Assessment Type Final Discharge Note   Anticipated Discharge Disposition [facility] Resources/Appts/Education Provided Post-Acute resouces added to AVS   Post-Acute Status   Post-Acute Authorization Placement;Hospice   Post-Acute Placement Status Pending post-acute provider review/more information requested  (Discussed with Becki Abad. Alerted to orders in [facility]. Pt to return with hospice. Facility reported in contract with [facility]. Referral info also sent to them. AWAITING REPORT INFO.)   Hospice Status Pending post acute provider review/more information requested  (Referrals sent via [facility]. Pt/family will have to sign with hospice prior to return.)   Discharge Delays (!) Post-Acute Set-up        Medication List        START taking these medications      clopidogreL 75 mg tablet  Commonly known as: PLAVIX  Take 1 tablet (75 mg total) by mouth once daily.            CHANGE how you take these medications      traZODone 50 MG tablet  Commonly known as: DESYREL  Take 0.5 tablets (25 mg total) by mouth nightly as needed for Insomnia.  What changed:   how much to take  when to take this  reasons to take this  additional instructions            CONTINUE taking these medications      amLODIPine 10 MG tablet  Commonly known as: NORVASC     aspirin 81 MG EC tablet  Commonly known as: ECOTRIN     atorvastatin 80 MG tablet  Commonly known as: LIPITOR     cloNIDine 0.1 mg/24 hr td ptwk 0.1 mg/24 hr  Commonly known as: CATAPRES     glipiZIDE 5 MG Tr24  Commonly known as: GLUCOTROL     lisinopriL 40 MG " tablet  Commonly known as: PRINIVIL,ZESTRIL     LORazepam 0.5 MG tablet  Commonly known as: ATIVAN     metFORMIN 1000 MG tablet  Commonly known as: GLUCOPHAGE     mirtazapine 15 MG tablet  Commonly known as: REMERON            STOP taking these medications      fenofibrate 145 MG tablet  Commonly known as: TRICOR     loratadine 10 mg tablet  Commonly known as: CLARITIN               Where to Get Your Medications        Information about where to get these medications is not yet available    Ask your nurse or doctor about these medications  clopidogreL 75 mg tablet  traZODone 50 MG tablet

## 2022-04-05 VITALS
OXYGEN SATURATION: 93 % | WEIGHT: 139 LBS | HEART RATE: 78 BPM | HEIGHT: 68 IN | TEMPERATURE: 98 F | BODY MASS INDEX: 21.07 KG/M2 | RESPIRATION RATE: 16 BRPM | DIASTOLIC BLOOD PRESSURE: 73 MMHG | SYSTOLIC BLOOD PRESSURE: 148 MMHG

## 2022-04-05 LAB
BACTERIA BLD CULT: ABNORMAL
BACTERIA BLD CULT: NORMAL

## 2022-04-05 PROCEDURE — 25000003 PHARM REV CODE 250: Performed by: STUDENT IN AN ORGANIZED HEALTH CARE EDUCATION/TRAINING PROGRAM

## 2022-04-05 RX ORDER — SCOLOPAMINE TRANSDERMAL SYSTEM 1 MG/1
1 PATCH, EXTENDED RELEASE TRANSDERMAL
Qty: 24 PATCH | Refills: 3 | Status: SHIPPED | OUTPATIENT
Start: 2022-04-05

## 2022-04-05 RX ORDER — MORPHINE SULFATE 20 MG/5ML
2.5 SOLUTION ORAL
Qty: 100 ML | Refills: 0 | Status: SHIPPED | OUTPATIENT
Start: 2022-04-05

## 2022-04-05 RX ORDER — MORPHINE SULFATE 20 MG/5ML
2.5 SOLUTION ORAL
Qty: 100 ML | Refills: 0 | Status: SHIPPED | OUTPATIENT
Start: 2022-04-05 | End: 2022-04-05 | Stop reason: SDUPTHER

## 2022-04-05 RX ORDER — SCOLOPAMINE TRANSDERMAL SYSTEM 1 MG/1
1 PATCH, EXTENDED RELEASE TRANSDERMAL
Status: DISCONTINUED | OUTPATIENT
Start: 2022-04-05 | End: 2022-04-05 | Stop reason: HOSPADM

## 2022-04-05 RX ADMIN — AMLODIPINE BESYLATE 10 MG: 5 TABLET ORAL at 08:04

## 2022-04-05 RX ADMIN — INSULIN DETEMIR 7 UNITS: 100 INJECTION, SOLUTION SUBCUTANEOUS at 08:04

## 2022-04-05 RX ADMIN — SCOPALAMINE 1 PATCH: 1 PATCH, EXTENDED RELEASE TRANSDERMAL at 01:04

## 2022-04-05 RX ADMIN — MUPIROCIN: 20 OINTMENT TOPICAL at 08:04

## 2022-04-05 RX ADMIN — ASPIRIN 81 MG CHEWABLE TABLET 81 MG: 81 TABLET CHEWABLE at 08:04

## 2022-04-05 RX ADMIN — CLOPIDOGREL 75 MG: 75 TABLET, FILM COATED ORAL at 08:04

## 2022-04-05 RX ADMIN — LISINOPRIL 40 MG: 20 TABLET ORAL at 08:04

## 2022-04-05 NOTE — PLAN OF CARE
"   04/05/22 1216   Post-Acute Status   Post-Acute Authorization Placement   Post-Acute Placement Status Set-up Complete/Auth obtained   Hospice Status Set-up Complete/Auth obtained   Discharge Delays (!) PFC Arranged Transportation   Discharge Plan   Discharge Plan A Hospice/home;Return to nursing home     [place] confirmed with [facility] equipment has been delivered and set up. Orders were received by facility. Report information to be given to bedside nurse. [facility] to follow up with daughter and update to transfer.    No future appointments.    /73 (Patient Position: Lying)   Pulse 74   Temp 97.9 °F (36.6 °C) (Axillary)   Resp 18   Ht 5' 8" (1.727 m)   Wt 63 kg (139 lb)   SpO2 (!) 93%   Breastfeeding No   BMI 21.13 kg/m²        Medication List        START taking these medications      clopidogreL 75 mg tablet  Commonly known as: PLAVIX  Take 1 tablet (75 mg total) by mouth once daily.     morphine 20 mg/5 mL (4 mg/mL) solution  Take 0.6 mLs (2.4 mg total) by mouth every 3 (three) hours as needed for Pain (cough, pleurisy, SOB).     scopolamine 1.3-1.5 mg (1 mg over 3 days)  Commonly known as: TRANSDERM-SCOP  Place 1 patch onto the skin Every 3 (three) days.            CHANGE how you take these medications      traZODone 50 MG tablet  Commonly known as: DESYREL  Take 0.5 tablets (25 mg total) by mouth nightly as needed for Insomnia.  What changed:   how much to take  when to take this  reasons to take this  additional instructions            CONTINUE taking these medications      amLODIPine 10 MG tablet  Commonly known as: NORVASC     aspirin 81 MG EC tablet  Commonly known as: ECOTRIN     atorvastatin 80 MG tablet  Commonly known as: LIPITOR     cloNIDine 0.1 mg/24 hr td ptwk 0.1 mg/24 hr  Commonly known as: CATAPRES     glipiZIDE 5 MG Tr24  Commonly known as: GLUCOTROL     lisinopriL 40 MG tablet  Commonly known as: PRINIVIL,ZESTRIL     LORazepam 0.5 MG tablet  Commonly known as: " ATIVAN  Take 1 tablet (0.5 mg total) by mouth 2 (two) times daily.     metFORMIN 1000 MG tablet  Commonly known as: GLUCOPHAGE     mirtazapine 15 MG tablet  Commonly known as: REMERON            STOP taking these medications      fenofibrate 145 MG tablet  Commonly known as: TRICOR     loratadine 10 mg tablet  Commonly known as: CLARITIN               Where to Get Your Medications        These medications were sent to [facility] [address] [address]  [address] [address]      Phone: [phone]   scopolamine 1.3-1.5 mg (1 mg over 3 days)       You can get these medications from any pharmacy    Bring a paper prescription for each of these medications  LORazepam 0.5 MG tablet  morphine 20 mg/5 mL (4 mg/mL) solution       Information about where to get these medications is not yet available    Ask your nurse or doctor about these medications  clopidogreL 75 mg tablet  traZODone 50 MG tablet

## 2022-04-05 NOTE — PLAN OF CARE
04/05/22 0907   Final Note   Assessment Type Final Discharge Note   Anticipated Discharge Disposition [facility]  (at [facility])   Hospital Resources/Appts/Education Provided Post-Acute resouces added to AVS   Post-Acute Status   Post-Acute Authorization Placement;Hospice   Post-Acute Placement Status Set-up Complete/Auth obtained  (Updated orders to be faxed to facility. Discussed with Becki this am.)   Hospice Status Pending equipment/medication delivery  (Discussed with Bess with [facility]. Daughter signed with [facility]. Bess is coordinating with [facility] for contracts and equipment set up.)   Discharge Delays (!) Post-Acute Set-up        Medication List        START taking these medications      clopidogreL 75 mg tablet  Commonly known as: PLAVIX  Take 1 tablet (75 mg total) by mouth once daily.            CHANGE how you take these medications      traZODone 50 MG tablet  Commonly known as: DESYREL  Take 0.5 tablets (25 mg total) by mouth nightly as needed for Insomnia.  What changed:   how much to take  when to take this  reasons to take this  additional instructions            CONTINUE taking these medications      amLODIPine 10 MG tablet  Commonly known as: NORVASC     aspirin 81 MG EC tablet  Commonly known as: ECOTRIN     atorvastatin 80 MG tablet  Commonly known as: LIPITOR     cloNIDine 0.1 mg/24 hr td ptwk 0.1 mg/24 hr  Commonly known as: CATAPRES     glipiZIDE 5 MG Tr24  Commonly known as: GLUCOTROL     lisinopriL 40 MG tablet  Commonly known as: PRINIVIL,ZESTRIL     LORazepam 0.5 MG tablet  Commonly known as: ATIVAN  Take 1 tablet (0.5 mg total) by mouth 2 (two) times daily.     metFORMIN 1000 MG tablet  Commonly known as: GLUCOPHAGE     mirtazapine 15 MG tablet  Commonly known as: REMERON            STOP taking these medications      fenofibrate 145 MG tablet  Commonly known as: TRICOR     loratadine 10 mg tablet  Commonly known as: CLARITIN               Where to Get Your  Medications        You can get these medications from any pharmacy    Bring a paper prescription for each of these medications  LORazepam 0.5 MG tablet       Information about where to get these medications is not yet available    Ask your nurse or doctor about these medications  clopidogreL 75 mg tablet  traZODone 50 MG tablet

## 2022-04-05 NOTE — PLAN OF CARE
VN note: VN completed AVS and attachments and notified bedside nurseEdna. Will cont to be available and intervene prn.

## 2022-04-12 NOTE — PHYSICIAN QUERY
PT Name: Renee Sultana  MR #: [id number]    DOCUMENTATION CLARIFICATION     CDS/: Ayleen AMADO, RN               Contact information: [email or web address]    This form is a permanent document in the medical record.     Query Date: April 12, 2022    By submitting this query, we are merely seeking further clarification of documentation. Please utilize your independent clinical judgment when addressing the question(s) below.    The Medical Record contains the following:   Indicators   Supporting Clinical Findings Location in Medical Record   x AMS, Confusion,  LOC, etc.   Patient here from NH due to altered mental status.         Found to have UTI      Hospital Medicine H&P 3/31/22   x Acute/Chronic Illness  Encephalopathy, metabolic       Sent from nursing home 2/2 being more lethargic than baseline       No hallucinations or confusion noted     Overnight, patient's daughter indicated the AMS patient had been experiencing since earlier this week was not at baseline. Further evaluation undertaken and patient found to have acute CVA on MRI.   Hospital Medicine H&P 3/31/22       Hospital Medicine progress 4/1/22   x Radiology Findings  MRI brain w/o contrast w/ large acute infarction in the right corona radiata with extension into the right basal ganglia and also involvement of the right hippocampus.  Discharge Summary 4/5/22    Electrolyte Imbalance      Medication      Treatment             x Other  Patient with obvious neurological deficits originally thought to be at baseline. However, spoke to daughter at bedside and apparently these deficits are new.   Hospital Medicine Care Update 4/1/22     The noted clinical guidelines are only system guidelines and do not replace the providers clinical judgment.    The National [place] of Neurologic Disorders and Stroke (NINDS) of the NIH describes encephalopathy as any diffuse disease of the brain that alters brain function or structure.    Please clarify the  diagnosis associated with above clinical findings. Thank you.    [   ]  Encephalopathy ruled out, AMS due to CVA     [  x ]  AMS due to metabolic enceph from UTI that resolved     [   ]  Encephalopathy 2/2 CVA       [   ]  Other Encephalopathy (please specify): ____________________     [   ]  Clinically Undetermined     Please document in your progress notes daily for the duration of treatment until resolved, and include in your discharge summary.    References:  LINDA Echevarria RN, CCDS. (2018, June 9). Notes from the Instructor: Encephalopathy tips. Retrieved October 22, 2020, from [email or web address].[email or web address]/articles/[email or web address]-instructor-encephalopathy-tips    ICD-9-CM Coding Clinic First Quarter 2013, Effective with discharges: October 21, 2013 [facility] [employer] § Seizure with encephalopathy due to postictal state (2013).    ICD-10-CM/[employer] Integrated Codebook (Version V.20.8.10.0) [Computer software]. (2020). Retrieved October 21, 2020.    National [place] of Neurological Disorders and Stroke. (2019, March 27). Retrieved October 22, 2020, from https://www.ninds.nih.gov/Disorders/All-Disorders/Fykdytykkfdpdn-Oikfnwikeyp-Avac    Form No. 53425

## 2022-05-05 ENCOUNTER — PATIENT MESSAGE (OUTPATIENT)
Dept: RESEARCH | Facility: HOSPITAL | Age: 77
End: 2022-05-05
Payer: MEDICARE